# Patient Record
Sex: FEMALE | Race: BLACK OR AFRICAN AMERICAN | NOT HISPANIC OR LATINO | Employment: OTHER | URBAN - METROPOLITAN AREA
[De-identification: names, ages, dates, MRNs, and addresses within clinical notes are randomized per-mention and may not be internally consistent; named-entity substitution may affect disease eponyms.]

---

## 2018-05-15 ENCOUNTER — HOSPITAL ENCOUNTER (INPATIENT)
Facility: HOSPITAL | Age: 78
LOS: 1 days | Discharge: HOME HEALTH | DRG: 638 | End: 2018-05-17
Attending: EMERGENCY MEDICINE | Admitting: INTERNAL MEDICINE
Payer: MEDICARE

## 2018-05-15 DIAGNOSIS — R53.1 WEAKNESS: ICD-10-CM

## 2018-05-15 DIAGNOSIS — K59.00 CONSTIPATION: ICD-10-CM

## 2018-05-15 DIAGNOSIS — Q84.5 ENLARGED AND HYPERTROPHIC NAILS: ICD-10-CM

## 2018-05-15 DIAGNOSIS — I10 HYPERTENSION: ICD-10-CM

## 2018-05-15 DIAGNOSIS — R73.9 HYPERGLYCEMIA: Primary | ICD-10-CM

## 2018-05-15 DIAGNOSIS — E11.01 TYPE 2 DIABETES MELLITUS WITH HYPEROSMOLAR COMA, UNSPECIFIED WHETHER LONG TERM INSULIN USE (HCC): ICD-10-CM

## 2018-05-15 LAB
ACETONE SERPL-MCNC: NEGATIVE MG/DL
ALBUMIN SERPL BCP-MCNC: 3.6 G/DL (ref 3.5–5)
ALP SERPL-CCNC: 103 U/L (ref 46–116)
ALT SERPL W P-5'-P-CCNC: 19 U/L (ref 12–78)
ANION GAP SERPL CALCULATED.3IONS-SCNC: 5 MMOL/L (ref 4–13)
AST SERPL W P-5'-P-CCNC: 13 U/L (ref 5–45)
BACTERIA UR QL AUTO: NORMAL /HPF
BASOPHILS # BLD AUTO: 0.03 THOUSANDS/ΜL (ref 0–0.1)
BASOPHILS NFR BLD AUTO: 1 % (ref 0–1)
BILIRUB SERPL-MCNC: 0.9 MG/DL (ref 0.2–1)
BILIRUB UR QL STRIP: NEGATIVE
BUN SERPL-MCNC: 21 MG/DL (ref 5–25)
CALCIUM SERPL-MCNC: 10.6 MG/DL (ref 8.3–10.1)
CHLORIDE SERPL-SCNC: 96 MMOL/L (ref 100–108)
CLARITY UR: CLEAR
CO2 SERPL-SCNC: 31 MMOL/L (ref 21–32)
COLOR UR: YELLOW
CREAT SERPL-MCNC: 1.15 MG/DL (ref 0.6–1.3)
EOSINOPHIL # BLD AUTO: 0.07 THOUSAND/ΜL (ref 0–0.61)
EOSINOPHIL NFR BLD AUTO: 2 % (ref 0–6)
ERYTHROCYTE [DISTWIDTH] IN BLOOD BY AUTOMATED COUNT: 11.5 % (ref 11.6–15.1)
EST. AVERAGE GLUCOSE BLD GHB EST-MCNC: 240 MG/DL
GFR SERPL CREATININE-BSD FRML MDRD: 46 ML/MIN/1.73SQ M
GLUCOSE SERPL-MCNC: 280 MG/DL (ref 65–140)
GLUCOSE SERPL-MCNC: 283 MG/DL (ref 65–140)
GLUCOSE SERPL-MCNC: 486 MG/DL (ref 65–140)
GLUCOSE SERPL-MCNC: 491 MG/DL (ref 65–140)
GLUCOSE UR STRIP-MCNC: ABNORMAL MG/DL
HBA1C MFR BLD: 10 % (ref 4.2–6.3)
HCT VFR BLD AUTO: 39.3 % (ref 34.8–46.1)
HGB BLD-MCNC: 13.7 G/DL (ref 11.5–15.4)
HGB UR QL STRIP.AUTO: NEGATIVE
KETONES UR STRIP-MCNC: NEGATIVE MG/DL
LEUKOCYTE ESTERASE UR QL STRIP: ABNORMAL
LIPASE SERPL-CCNC: 75 U/L (ref 73–393)
LYMPHOCYTES # BLD AUTO: 2.22 THOUSANDS/ΜL (ref 0.6–4.47)
LYMPHOCYTES NFR BLD AUTO: 46 % (ref 14–44)
MCH RBC QN AUTO: 31.9 PG (ref 26.8–34.3)
MCHC RBC AUTO-ENTMCNC: 34.9 G/DL (ref 31.4–37.4)
MCV RBC AUTO: 92 FL (ref 82–98)
MONOCYTES # BLD AUTO: 0.24 THOUSAND/ΜL (ref 0.17–1.22)
MONOCYTES NFR BLD AUTO: 5 % (ref 4–12)
NEUTROPHILS # BLD AUTO: 2.24 THOUSANDS/ΜL (ref 1.85–7.62)
NEUTS SEG NFR BLD AUTO: 47 % (ref 43–75)
NITRITE UR QL STRIP: NEGATIVE
NON-SQ EPI CELLS URNS QL MICRO: NORMAL /HPF
NRBC BLD AUTO-RTO: 0 /100 WBCS
OTHER STN SPEC: NORMAL
PH UR STRIP.AUTO: 6 [PH] (ref 4.5–8)
PLATELET # BLD AUTO: 177 THOUSANDS/UL (ref 149–390)
PLATELET # BLD AUTO: 181 THOUSANDS/UL (ref 149–390)
PMV BLD AUTO: 10.8 FL (ref 8.9–12.7)
PMV BLD AUTO: 10.9 FL (ref 8.9–12.7)
POTASSIUM SERPL-SCNC: 4.2 MMOL/L (ref 3.5–5.3)
PROT SERPL-MCNC: 8.9 G/DL (ref 6.4–8.2)
PROT UR STRIP-MCNC: ABNORMAL MG/DL
RBC # BLD AUTO: 4.29 MILLION/UL (ref 3.81–5.12)
RBC #/AREA URNS AUTO: NORMAL /HPF
SODIUM SERPL-SCNC: 132 MMOL/L (ref 136–145)
SP GR UR STRIP.AUTO: 1.01 (ref 1–1.03)
TROPONIN I SERPL-MCNC: <0.02 NG/ML
TSH SERPL DL<=0.05 MIU/L-ACNC: 3.8 UIU/ML (ref 0.36–3.74)
UROBILINOGEN UR QL STRIP.AUTO: 0.2 E.U./DL
WBC # BLD AUTO: 4.81 THOUSAND/UL (ref 4.31–10.16)
WBC #/AREA URNS AUTO: NORMAL /HPF

## 2018-05-15 PROCEDURE — 85025 COMPLETE CBC W/AUTO DIFF WBC: CPT | Performed by: EMERGENCY MEDICINE

## 2018-05-15 PROCEDURE — 82009 KETONE BODYS QUAL: CPT | Performed by: EMERGENCY MEDICINE

## 2018-05-15 PROCEDURE — 99285 EMERGENCY DEPT VISIT HI MDM: CPT

## 2018-05-15 PROCEDURE — 84443 ASSAY THYROID STIM HORMONE: CPT | Performed by: PHYSICIAN ASSISTANT

## 2018-05-15 PROCEDURE — 85049 AUTOMATED PLATELET COUNT: CPT | Performed by: PHYSICIAN ASSISTANT

## 2018-05-15 PROCEDURE — 96361 HYDRATE IV INFUSION ADD-ON: CPT

## 2018-05-15 PROCEDURE — 83690 ASSAY OF LIPASE: CPT | Performed by: EMERGENCY MEDICINE

## 2018-05-15 PROCEDURE — 80053 COMPREHEN METABOLIC PANEL: CPT | Performed by: EMERGENCY MEDICINE

## 2018-05-15 PROCEDURE — 82948 REAGENT STRIP/BLOOD GLUCOSE: CPT

## 2018-05-15 PROCEDURE — 84484 ASSAY OF TROPONIN QUANT: CPT | Performed by: EMERGENCY MEDICINE

## 2018-05-15 PROCEDURE — 36415 COLL VENOUS BLD VENIPUNCTURE: CPT | Performed by: EMERGENCY MEDICINE

## 2018-05-15 PROCEDURE — 83036 HEMOGLOBIN GLYCOSYLATED A1C: CPT | Performed by: PHYSICIAN ASSISTANT

## 2018-05-15 PROCEDURE — 96360 HYDRATION IV INFUSION INIT: CPT

## 2018-05-15 PROCEDURE — 81001 URINALYSIS AUTO W/SCOPE: CPT | Performed by: EMERGENCY MEDICINE

## 2018-05-15 PROCEDURE — 99220 PR INITIAL OBSERVATION CARE/DAY 70 MINUTES: CPT | Performed by: INTERNAL MEDICINE

## 2018-05-15 PROCEDURE — 93005 ELECTROCARDIOGRAM TRACING: CPT

## 2018-05-15 RX ORDER — INSULIN GLARGINE 100 [IU]/ML
10 INJECTION, SOLUTION SUBCUTANEOUS
Status: DISCONTINUED | OUTPATIENT
Start: 2018-05-15 | End: 2018-05-16

## 2018-05-15 RX ORDER — SODIUM CHLORIDE 9 MG/ML
75 INJECTION, SOLUTION INTRAVENOUS CONTINUOUS
Status: DISCONTINUED | OUTPATIENT
Start: 2018-05-15 | End: 2018-05-16

## 2018-05-15 RX ORDER — INSULIN GLARGINE 100 [IU]/ML
10 INJECTION, SOLUTION SUBCUTANEOUS ONCE
Status: COMPLETED | OUTPATIENT
Start: 2018-05-15 | End: 2018-05-15

## 2018-05-15 RX ORDER — DOCUSATE SODIUM 100 MG/1
100 CAPSULE, LIQUID FILLED ORAL 2 TIMES DAILY
Status: DISCONTINUED | OUTPATIENT
Start: 2018-05-15 | End: 2018-05-17 | Stop reason: HOSPADM

## 2018-05-15 RX ORDER — ACETAMINOPHEN 325 MG/1
650 TABLET ORAL EVERY 6 HOURS PRN
Status: DISCONTINUED | OUTPATIENT
Start: 2018-05-15 | End: 2018-05-17 | Stop reason: HOSPADM

## 2018-05-15 RX ORDER — CALCIUM CARBONATE 200(500)MG
1000 TABLET,CHEWABLE ORAL DAILY PRN
Status: DISCONTINUED | OUTPATIENT
Start: 2018-05-15 | End: 2018-05-17 | Stop reason: HOSPADM

## 2018-05-15 RX ORDER — HEPARIN SODIUM 5000 [USP'U]/ML
5000 INJECTION, SOLUTION INTRAVENOUS; SUBCUTANEOUS EVERY 8 HOURS SCHEDULED
Status: DISCONTINUED | OUTPATIENT
Start: 2018-05-15 | End: 2018-05-17 | Stop reason: HOSPADM

## 2018-05-15 RX ORDER — LISINOPRIL 5 MG/1
5 TABLET ORAL DAILY
COMMUNITY

## 2018-05-15 RX ADMIN — DOCUSATE SODIUM 100 MG: 100 CAPSULE, LIQUID FILLED ORAL at 17:41

## 2018-05-15 RX ADMIN — INSULIN HUMAN 8 UNITS: 100 INJECTION, SOLUTION PARENTERAL at 11:03

## 2018-05-15 RX ADMIN — INSULIN LISPRO 5 UNITS: 100 INJECTION, SOLUTION INTRAVENOUS; SUBCUTANEOUS at 12:59

## 2018-05-15 RX ADMIN — SODIUM CHLORIDE 75 ML/HR: 0.9 INJECTION, SOLUTION INTRAVENOUS at 12:51

## 2018-05-15 RX ADMIN — HEPARIN SODIUM 5000 UNITS: 5000 INJECTION, SOLUTION INTRAVENOUS; SUBCUTANEOUS at 21:20

## 2018-05-15 RX ADMIN — INSULIN LISPRO 3 UNITS: 100 INJECTION, SOLUTION INTRAVENOUS; SUBCUTANEOUS at 21:21

## 2018-05-15 RX ADMIN — INSULIN HUMAN 15 UNITS: 100 INJECTION, SOLUTION PARENTERAL at 12:20

## 2018-05-15 RX ADMIN — INSULIN GLARGINE 10 UNITS: 100 INJECTION, SOLUTION SUBCUTANEOUS at 13:35

## 2018-05-15 RX ADMIN — SODIUM CHLORIDE 1000 ML: 0.9 INJECTION, SOLUTION INTRAVENOUS at 08:08

## 2018-05-15 RX ADMIN — DOCUSATE SODIUM 100 MG: 100 CAPSULE, LIQUID FILLED ORAL at 13:35

## 2018-05-15 RX ADMIN — HEPARIN SODIUM 5000 UNITS: 5000 INJECTION, SOLUTION INTRAVENOUS; SUBCUTANEOUS at 13:35

## 2018-05-15 RX ADMIN — INSULIN LISPRO 3 UNITS: 100 INJECTION, SOLUTION INTRAVENOUS; SUBCUTANEOUS at 17:00

## 2018-05-15 RX ADMIN — INSULIN GLARGINE 10 UNITS: 100 INJECTION, SOLUTION SUBCUTANEOUS at 21:21

## 2018-05-15 NOTE — ASSESSMENT & PLAN NOTE
Multifactorial, likely largely driven by the hyperglycemia, most likely persistent  · PT/OT consult  · Fall precaution  · Ambulate with rolling walker

## 2018-05-15 NOTE — ED PROVIDER NOTES
History  Chief Complaint   Patient presents with    Weakness - Generalized     Per EMS patient comes to c/o generalized weakness  Patient is non compliant diabetic per EMS, blood sugar 418  Patient states to EMS she is also taking an antibiotic at this time, however is unsure what she is being treated for  Patient presents to the emergency department via ambulance with complaints of weakness and urinary incontinence  Patient is a very poor historian and is unable to provide further details  She denies dysuria fever chills rash  She denies nausea vomiting or abdominal pain  She denies chest pain sob  She is a noncompliant diabetic and her pre-hospital glucose was 418  She denies headache or neurologic complaints other than generalized weakness  It is unclear who called the ambulance as she lives by herself  None       Past Medical History:   Diagnosis Date    Cardiac disease     Diabetes mellitus ([facility] [address] )     Hyperlipidemia     Hypertension        Past Surgical History:   Procedure Laterality Date    ANGIOPLASTY      UTERINE FIBROID SURGERY         History reviewed  No pertinent family history  I have reviewed and agree with the history as documented  Social History   Substance Use Topics    Smoking status: Never Smoker    Smokeless tobacco: Never Used    Alcohol use No        Review of Systems   Constitutional: Positive for fatigue  Negative for activity change, appetite change, chills, diaphoresis and fever  HENT: Negative  Negative for congestion, dental problem, drooling, ear discharge, ear pain, facial swelling, rhinorrhea, sinus pain, sinus pressure, sore throat, trouble swallowing and voice change  Eyes: Negative  Negative for photophobia and visual disturbance  Respiratory: Negative  Negative for cough, chest tightness, shortness of breath and wheezing  Cardiovascular: Negative  Negative for chest pain, palpitations and leg swelling     Gastrointestinal: Negative  Negative for abdominal distention, abdominal pain, anal bleeding, blood in stool, constipation, diarrhea, nausea, rectal pain and vomiting  Endocrine: Negative  Genitourinary: Negative  Negative for difficulty urinating, dysuria, flank pain, frequency, hematuria, urgency, vaginal bleeding, vaginal discharge and vaginal pain  Musculoskeletal: Negative  Negative for arthralgias, back pain, myalgias, neck pain and neck stiffness  Skin: Negative  Negative for rash and wound  Allergic/Immunologic: Negative  Neurological: Positive for weakness  Negative for dizziness, tremors, seizures, syncope, facial asymmetry, speech difficulty, light-headedness, numbness and headaches  Hematological: Negative  Psychiatric/Behavioral: Negative  Physical Exam  ED Triage Vitals [05/15/18 0734]   Temperature Pulse Respirations Blood Pressure SpO2   98 3 °F (36 8 °C) 62 16 159/65 --      Temp Source Heart Rate Source Patient Position - Orthostatic VS BP Location FiO2 (%)   Oral Monitor Lying Right arm --      Pain Score       --           Orthostatic Vital Signs  Vitals:    05/15/18 0734   BP: 159/65   Pulse: 62   Patient Position - Orthostatic VS: Lying       Physical Exam   Constitutional: She is oriented to person, place, and time  She appears well-developed and well-nourished  Comfortable appearance  No respiratory distress  Patient is nontoxic  She Billy answer simple questions appropriately and follow simple commands   HENT:   Head: Normocephalic and atraumatic  Right Ear: External ear normal    Left Ear: External ear normal    Mouth/Throat: Oropharynx is clear and moist    Eyes: Conjunctivae and EOM are normal  Pupils are equal, round, and reactive to light  Neck: Normal range of motion  Neck supple  Cardiovascular: Normal rate, regular rhythm, normal heart sounds and intact distal pulses  Pulmonary/Chest: [place] normal and breath sounds normal  No respiratory distress   She has no wheezes  She has no rales  She exhibits no tenderness  Abdominal: Soft  Bowel sounds are normal  She exhibits no distension and no mass  There is no tenderness  There is no rebound and no guarding  No hernia  No reproducible tenderness or peritoneal signs   Musculoskeletal: Normal range of motion  She exhibits no edema, tenderness or deformity  Neurological: She is alert and oriented to person, place, and time  She has normal reflexes  She displays normal reflexes  No cranial nerve deficit or sensory deficit  She exhibits normal muscle tone  Coordination normal    Skin: Skin is warm and dry  No rash noted  No erythema  No pallor  Psychiatric: She has a normal mood and affect  Her behavior is normal  Judgment and thought content normal    Nursing note and vitals reviewed  ED Medications  Medications   insulin regular (HumuLIN R,NovoLIN R) injection 8 Units (not administered)   sodium chloride 0 9 % bolus 1,000 mL (0 mL Intravenous Stopped 5/15/18 0959)       Diagnostic Studies  Results Reviewed     Procedure Component Value Units Date/Time    Urine Microscopic [[id number]] Collected:  05/15/18 0920    Lab Status:  Final result Specimen:  Urine from Urine, Clean Catch Updated:  05/15/18 0937     RBC, UA None Seen /hpf      WBC, UA None Seen /hpf      Epithelial Cells Occasional /hpf      Bacteria, UA None Seen /hpf      OTHER OBSERVATIONS Yeast Cells Present    UA w Reflex to Microscopic [[id number]]  (Abnormal) Collected:  05/15/18 0920    Lab Status:  Final result Specimen:  Urine from Urine, Clean Catch Updated:  05/15/18 0927     Color, UA Yellow     Clarity, UA Clear     Specific Gravity, UA 1 010     pH, UA 6 0     Leukocytes, UA Elevated glucose may cause decreased leukocyte values   See urine microscopic for [facility] result/ (A)     Nitrite, UA Negative     Protein, UA 30 (1+) (A) mg/dl      Glucose, UA >=1000 (1%) (A) mg/dl      Ketones, UA Negative mg/dl      Urobilinogen, UA 0 2 E U /dl      Bilirubin, UA Negative     Blood, UA Negative    Troponin I [[id number]]  (Normal) Collected:  05/15/18 0806    Lab Status:  Final result Specimen:  Blood from Arm, Right Updated:  05/15/18 0840     Troponin I <0 02 ng/mL     Narrative:         [employer] Chemistry analyzer 99% cutoff is > 0 04 ng/mL in network labs    o cTnI 99% cutoff is useful only when applied to patients in the clinical setting of myocardial ischemia  o cTnI 99% cutoff should be interpreted in the context of clinical history, ECG findings and possibly cardiac imaging to establish correct diagnosis  o cTnI 99% cutoff may be suggestive but clearly not indicative of a coronary event without the clinical setting of myocardial ischemia  Comprehensive metabolic panel [[id number]]  (Abnormal) Collected:  05/15/18 0806    Lab Status:  Final result Specimen:  Blood from Arm, Right Updated:  05/15/18 0835     Sodium 132 (L) mmol/L      Potassium 4 2 mmol/L      Chloride 96 (L) mmol/L      CO2 31 mmol/L      Anion Gap 5 mmol/L      BUN 21 mg/dL      Creatinine 1 15 mg/dL      Glucose 491 (H) mg/dL      Calcium 10 6 (H) mg/dL      AST 13 U/L      ALT 19 U/L      Alkaline Phosphatase 103 U/L      Total Protein 8 9 (H) g/dL      Albumin 3 6 g/dL      Total Bilirubin 0 90 mg/dL      eGFR 46 ml/min/1 73sq m     Narrative:         National Kidney Disease Education Program recommendations are as follows:  GFR calculation is accurate only with a steady state creatinine  Chronic Kidney disease less than 60 ml/min/1 73 sq  meters  Kidney failure less than 15 ml/min/1 73 sq  meters      Lipase [[id number]]  (Normal) Collected:  05/15/18 0806    Lab Status:  Final result Specimen:  Blood from Arm, Right Updated:  05/15/18 0835     Lipase 75 u/L     Acetone [[id number]]  (Normal) Collected:  05/15/18 0806    Lab Status:  Final result Specimen:  Blood from Arm, Right Updated:  05/15/18 0830     Acetone, Bld Negative    CBC and differential [[id number]]  (Abnormal) Collected:  05/15/18 1819 Lab Status:  Final result Specimen:  Blood from Arm, Right Updated:  05/15/18 0813     WBC 4 81 Thousand/uL      RBC 4 29 Million/uL      Hemoglobin 13 7 g/dL      Hematocrit 39 3 %      MCV 92 fL      MCH 31 9 pg      MCHC 34 9 g/dL      RDW 11 5 (L) %      MPV 10 9 fL      Platelets 081 Thousands/uL      nRBC 0 /100 WBCs      Neutrophils Relative 47 %      Lymphocytes Relative 46 (H) %      Monocytes Relative 5 %      Eosinophils Relative 2 %      Basophils Relative 1 %      Neutrophils Absolute 2 24 Thousands/µL      Lymphocytes Absolute 2 22 Thousands/µL      Monocytes Absolute 0 24 Thousand/µL      Eosinophils Absolute 0 07 Thousand/µL      Basophils Absolute 0 03 Thousands/µL                  No orders to display              Procedures  ECG 12 Lead Documentation  Date/Time: 5/15/2018 8:03 AM  Performed by: Doris Farmer  Authorized by: Doris Farmer     ECG reviewed by me, the ED Provider: yes    Patient location:  ED  Previous ECG:     Previous ECG:  Unavailable  Interpretation:     Interpretation: abnormal    Rate:     ECG rate:  64    ECG rate assessment: normal    Rhythm:     Rhythm: paced    Pacing:     Capture:  Complete  Ectopy:     Ectopy: none    QRS:     QRS axis:  Left    QRS intervals: Wide  Conduction:     Conduction: normal    ST segments:     ST segments:  Non-specific  T waves:     T waves: non-specific    Other findings:     Other findings: LVH             Phone Contacts  ED Phone Contact    ED Course  ED Course as of May 15 1038   Tue May 15, 2018   1036   Patient to be admitted to the hospitalist service for further treatment and management of hyperglycemia  Insulin was given                                  MDM  CritCare Time    Disposition  Final diagnoses:   Hyperglycemia   Weakness   Hypertension     Time reflects when diagnosis was documented in both MDM as applicable and the Disposition within this note     Time User Action Codes Description Comment    5/15/2018 10:26 AM Jany Garland Add [R73 9] Hyperglycemia     5/15/2018 10:27 AM Peace Colin Add [R53 1] Weakness     5/15/2018 10:27 AM Peace Colin Add [I10] Hypertension       ED Disposition     ED Disposition Condition Comment    Admit  Case was discussed with PA and the patient's admission status was agreed to be Admission Status: observation status to the service of Dr Cecil Koo    None       Patient's Medications    No medications on file     No discharge procedures on file      ED Provider  Electronically Signed by           Maye Agn MD  05/15/18 4327

## 2018-05-15 NOTE — H&P
H&P- Mona Sanchez 1940, 68 y o  female MRN: [id number]    Unit/Bed#: -01 Encounter: [id number]    Primary Care Provider: No primary care provider on file  Date and time admitted to hospital: 5/15/2018  7:20 AM        * Hyperglycemia   Assessment & Plan    Secondary to uncontrolled diabetes, patient does not check her sugars at home and takes an unknown amount of Humulin  · Bring patient in under observation, non telemetry  · Labs do not show evidence of ketoacidosis, no gap, (-) acetone, urine (-) for ketones  · Humulin R 15 units now, Lantus 10 units now, initiate sliding scale, 10 units Lantus at night  · Q i d  blood glucose checks, consistent carb diet, nutrition consult  · A1c pending  · Hyponatremia, within range for corrected hyperglycemia  · Patient will likely need placement, she is not safe to discharge home to manage her medications        Weakness   Assessment & Plan    Multifactorial, likely largely driven by the hyperglycemia, most likely persistent  · PT/OT consult  · Fall precaution  · Ambulate with rolling walker        Hypertension   Assessment & Plan    Elevated on admission, unsure what she is on other than "lisinopril"          VTE Prophylaxis: Heparin  / sequential compression device   Code Status: LEVEL 1  POLST: POLST form is not discussed and not completed at this time  Discussion with family: DIL at bedside, also discussed with son over the phone     Anticipated Length of Stay:  Patient will be admitted on an Observation basis with an anticipated length of stay of  < 2 midnights  Justification for Hospital Stay: Hyperglycemia    Total Time for Visit, including Counseling / Coordination of Care: 45 minutes  Greater than 50% of this total time spent on direct patient counseling and coordination of care  Chief Complaint:   Hyperglycemia    History of Present Illness:    Mona Sanchez is a 68 y o  female who presents with hyperglycemia    Patient is a poor historian, she is not clear what her medical conditions are or what medication she takes/doses that she is on  Daughter-in-law at the bedside, is on clear of her medical history as well  Just with the son, also unclear  Daughter, Charlene Nash (?)  Is POA, we have not been able to contact her yet  Patient presents for hyperglycemia, generalized weakness which is chronic  Review of Systems:    Review of Systems   Unable to perform ROS: Dementia       Past Medical and Surgical History:     Past Medical History:   Diagnosis Date    Cardiac disease     [facility] mellitus ([facility] [address] [facility]     Hyperlipidemia     Hypertension        Past Surgical History:   Procedure Laterality Date    ANGIOPLASTY      UTERINE FIBROID SURGERY         Meds/Allergies:    Prior to Admission medications    Medication Sig Start Date End Date Taking? Authorizing Provider   insulin regular (HumuLIN R,NovoLIN R) 100 units/mL injection Inject 10 Units under the skin once   Yes Historical Provider, MD   lisinopril (ZESTRIL) 5 mg tablet Take 5 mg by mouth daily   Yes Historical Provider, MD     I have been unable to obtain / verify an up to date medication list despite all reasonable attempts  Allergies: No Known Allergies    Social History:     Marital Status:    Occupation:   Patient Pre-hospital Living Situation:   Lives home alone  Patient Pre-hospital Level of Mobility:  Rolling walker  Patient Pre-hospital Diet Restrictions:  Patient does not restrict her diet  Substance Use History:   History   Alcohol Use No     History   Smoking Status    Never Smoker   Smokeless Tobacco    Never Used     History   Drug Use No       Family History:    History reviewed  No pertinent family history      Physical Exam:     Vitals:   Blood Pressure: 150/79 (05/15/18 1136)  Pulse: 69 (05/15/18 1136)  Temperature: 98 2 °F (36 8 °C) (05/15/18 1136)  Temp Source: Oral (05/15/18 1136)  Respirations: 18 (05/15/18 1136)  Height: 5' 8" (172 7 cm) (05/15/18 3934)  Weight - Scale: 89 9 kg (198 lb 1 6 oz) (05/15/18 0734)  SpO2: 100 % (05/15/18 1136)    Physical Exam   Constitutional: She appears well-developed  AAF, NAD   HENT:   Head: Normocephalic and atraumatic  Mouth/Throat: Oropharynx is clear and moist    Eyes: Conjunctivae and EOM are normal  Pupils are equal, round, and reactive to light  Arcus senilus bilateral   Neck: Neck supple  No JVD present  Carotid bruit is not present  No thyromegaly present  Cardiovascular: Normal rate, regular rhythm, S1 normal, S2 normal, normal heart sounds and intact distal pulses  No murmur heard  Pulmonary/Chest: [place] normal and breath sounds normal  No respiratory distress  She has no wheezes  She has no rhonchi  She has no rales  Abdominal: Soft  Normal appearance and bowel sounds are normal  She exhibits no distension  There is no tenderness  Musculoskeletal: She exhibits no edema  Lymphadenopathy:     She has no cervical adenopathy  Neurological: She is alert  No cranial nerve deficit  Skin: Skin is warm, dry and intact  Nursing note and vitals reviewed  Additional Data:     Lab Results: I have personally reviewed pertinent reports  Results from last 7 days  Lab Units 05/15/18  0806   WBC Thousand/uL 4 81   HEMOGLOBIN g/dL 13 7   HEMATOCRIT % 39 3   PLATELETS Thousands/uL 181   NEUTROS PCT % 47   LYMPHS PCT % 46*   MONOS PCT % 5   EOS PCT % 2       Results from last 7 days  Lab Units 05/15/18  0806   SODIUM mmol/L 132*   POTASSIUM mmol/L 4 2   CHLORIDE mmol/L 96*   CO2 mmol/L 31   BUN mg/dL 21   CREATININE mg/dL 1 15   CALCIUM mg/dL 10 6*   TOTAL PROTEIN g/dL 8 9*   BILIRUBIN TOTAL mg/dL 0 90   ALK PHOS U/L 103   ALT U/L 19   AST U/L 13   GLUCOSE RANDOM mg/dL 491*           Results from last 7 days  Lab Units 05/15/18  1138   POC GLUCOSE mg/dl 486*           Imaging: I have personally reviewed pertinent reports        No orders to display       EKG, Pathology, and Other Studies Reviewed on Admission: · EKG: None    Allscripts / Epic Records Reviewed: Yes none available     ** Please Note: This note has been constructed using a voice recognition system   **

## 2018-05-15 NOTE — PLAN OF CARE
Problem: Potential for Falls  Goal: Patient will remain free of falls  INTERVENTIONS:  - Assess patient frequently for physical needs  -  Identify cognitive and physical deficits and behaviors that affect risk of falls    -  [place] fall precautions as indicated by assessment   - Educate patient/family on patient safety including physical limitations  - Instruct patient to call for assistance with activity based on assessment  - Modify environment to reduce risk of injury  - Consider OT/PT consult to assist with strengthening/mobility   Outcome: Progressing      Problem: GENITOURINARY - ADULT  Goal: Maintains or returns to baseline urinary function  INTERVENTIONS:  - Assess urinary function  - Encourage oral fluids to ensure adequate hydration  - Administer IV fluids as ordered to ensure adequate hydration  - Administer ordered medications as needed  - Offer frequent toileting  - Follow urinary retention protocol if ordered  Outcome: Progressing      Problem: METABOLIC, FLUID AND ELECTROLYTES - ADULT  Goal: Electrolytes maintained within normal limits  INTERVENTIONS:  - Monitor labs and assess patient for signs and symptoms of electrolyte imbalances  - Administer electrolyte replacement as ordered  - Monitor response to electrolyte replacements, including repeat lab results as appropriate  - Instruct patient on fluid and nutrition as appropriate  Outcome: Progressing    Goal: Fluid balance maintained  INTERVENTIONS:  - Monitor labs and assess for signs and symptoms of volume excess or deficit  - Monitor I/O and WT  - Instruct patient on fluid and nutrition as appropriate  Outcome: Progressing    Goal: Glucose maintained within target range  INTERVENTIONS:  - Monitor Blood Glucose as ordered  - Assess for signs and symptoms of hyperglycemia and hypoglycemia  - Administer ordered medications to maintain glucose within target range  - Assess nutritional intake and initiate nutrition service referral as needed  Outcome: Progressing

## 2018-05-15 NOTE — ASSESSMENT & PLAN NOTE
Secondary to uncontrolled diabetes, patient does not check her sugars at home and takes an unknown amount of Humulin  · Bring patient in under observation, non telemetry  · Labs do not show evidence of ketoacidosis, no gap, (-) acetone, urine (-) for ketones  · Humulin R 15 units now, Lantus 10 units now, initiate sliding scale, 10 units Lantus at night  · Q i d  blood glucose checks, consistent carb diet, nutrition consult  · A1c pending  · Hyponatremia, within range for corrected hyperglycemia  · Patient will likely need placement, she is not safe to discharge home to manage her medications

## 2018-05-16 PROBLEM — E11.9 TYPE 2 DIABETES MELLITUS (HCC): Status: ACTIVE | Noted: 2018-05-15

## 2018-05-16 LAB
ANION GAP SERPL CALCULATED.3IONS-SCNC: 8 MMOL/L (ref 4–13)
ATRIAL RATE: 64 BPM
BUN SERPL-MCNC: 15 MG/DL (ref 5–25)
CALCIUM SERPL-MCNC: 9.6 MG/DL (ref 8.3–10.1)
CHLORIDE SERPL-SCNC: 102 MMOL/L (ref 100–108)
CO2 SERPL-SCNC: 28 MMOL/L (ref 21–32)
CREAT SERPL-MCNC: 0.9 MG/DL (ref 0.6–1.3)
GFR SERPL CREATININE-BSD FRML MDRD: 71 ML/MIN/1.73SQ M
GLUCOSE P FAST SERPL-MCNC: 241 MG/DL (ref 65–99)
GLUCOSE SERPL-MCNC: 177 MG/DL (ref 65–140)
GLUCOSE SERPL-MCNC: 241 MG/DL (ref 65–140)
GLUCOSE SERPL-MCNC: 259 MG/DL (ref 65–140)
GLUCOSE SERPL-MCNC: 404 MG/DL (ref 65–140)
GLUCOSE SERPL-MCNC: 404 MG/DL (ref 65–140)
P AXIS: 73 DEGREES
POTASSIUM SERPL-SCNC: 3.9 MMOL/L (ref 3.5–5.3)
PR INTERVAL: 134 MS
QRS AXIS: -76 DEGREES
QRSD INTERVAL: 168 MS
QT INTERVAL: 472 MS
QTC INTERVAL: 486 MS
SODIUM SERPL-SCNC: 138 MMOL/L (ref 136–145)
T WAVE AXIS: 96 DEGREES
VENTRICULAR RATE: 64 BPM

## 2018-05-16 PROCEDURE — G8979 MOBILITY GOAL STATUS: HCPCS

## 2018-05-16 PROCEDURE — 80048 BASIC METABOLIC PNL TOTAL CA: CPT | Performed by: PHYSICIAN ASSISTANT

## 2018-05-16 PROCEDURE — 93010 ELECTROCARDIOGRAM REPORT: CPT | Performed by: INTERNAL MEDICINE

## 2018-05-16 PROCEDURE — 99232 SBSQ HOSP IP/OBS MODERATE 35: CPT | Performed by: INTERNAL MEDICINE

## 2018-05-16 PROCEDURE — 82948 REAGENT STRIP/BLOOD GLUCOSE: CPT

## 2018-05-16 PROCEDURE — G8978 MOBILITY CURRENT STATUS: HCPCS

## 2018-05-16 PROCEDURE — 97163 PT EVAL HIGH COMPLEX 45 MIN: CPT

## 2018-05-16 RX ORDER — POLYETHYLENE GLYCOL 3350 17 G/17G
17 POWDER, FOR SOLUTION ORAL DAILY
Status: DISCONTINUED | OUTPATIENT
Start: 2018-05-16 | End: 2018-05-17 | Stop reason: HOSPADM

## 2018-05-16 RX ORDER — SENNOSIDES 8.6 MG
1 TABLET ORAL
Status: DISCONTINUED | OUTPATIENT
Start: 2018-05-16 | End: 2018-05-17 | Stop reason: HOSPADM

## 2018-05-16 RX ORDER — INSULIN GLARGINE 100 [IU]/ML
20 INJECTION, SOLUTION SUBCUTANEOUS
Status: DISCONTINUED | OUTPATIENT
Start: 2018-05-16 | End: 2018-05-17 | Stop reason: HOSPADM

## 2018-05-16 RX ADMIN — HEPARIN SODIUM 5000 UNITS: 5000 INJECTION, SOLUTION INTRAVENOUS; SUBCUTANEOUS at 05:23

## 2018-05-16 RX ADMIN — INSULIN GLARGINE 20 UNITS: 100 INJECTION, SOLUTION SUBCUTANEOUS at 22:03

## 2018-05-16 RX ADMIN — INSULIN LISPRO 1 UNITS: 100 INJECTION, SOLUTION INTRAVENOUS; SUBCUTANEOUS at 22:05

## 2018-05-16 RX ADMIN — POLYETHYLENE GLYCOL 3350 17 G: 17 POWDER, FOR SOLUTION ORAL at 10:09

## 2018-05-16 RX ADMIN — SODIUM CHLORIDE 75 ML/HR: 0.9 INJECTION, SOLUTION INTRAVENOUS at 02:43

## 2018-05-16 RX ADMIN — HEPARIN SODIUM 5000 UNITS: 5000 INJECTION, SOLUTION INTRAVENOUS; SUBCUTANEOUS at 13:40

## 2018-05-16 RX ADMIN — INSULIN LISPRO 10 UNITS: 100 INJECTION, SOLUTION INTRAVENOUS; SUBCUTANEOUS at 15:51

## 2018-05-16 RX ADMIN — SENNOSIDES 8.6 MG: 8.6 TABLET, FILM COATED ORAL at 22:03

## 2018-05-16 RX ADMIN — DOCUSATE SODIUM 100 MG: 100 CAPSULE, LIQUID FILLED ORAL at 08:10

## 2018-05-16 RX ADMIN — INSULIN LISPRO 2 UNITS: 100 INJECTION, SOLUTION INTRAVENOUS; SUBCUTANEOUS at 08:10

## 2018-05-16 RX ADMIN — INSULIN LISPRO 10 UNITS: 100 INJECTION, SOLUTION INTRAVENOUS; SUBCUTANEOUS at 13:40

## 2018-05-16 RX ADMIN — INSULIN LISPRO 5 UNITS: 100 INJECTION, SOLUTION INTRAVENOUS; SUBCUTANEOUS at 11:36

## 2018-05-16 RX ADMIN — DOCUSATE SODIUM 100 MG: 100 CAPSULE, LIQUID FILLED ORAL at 19:50

## 2018-05-16 RX ADMIN — INSULIN LISPRO 5 UNITS: 100 INJECTION, SOLUTION INTRAVENOUS; SUBCUTANEOUS at 15:51

## 2018-05-16 RX ADMIN — HEPARIN SODIUM 5000 UNITS: 5000 INJECTION, SOLUTION INTRAVENOUS; SUBCUTANEOUS at 22:03

## 2018-05-16 NOTE — SOCIAL WORK
Cm met w pt who resides by herself in an apartment with an elevator  Uses [facility], but reports some difficulty  Was recently visiting her son in [place] when she became sick  However, she does plan to return home  She does not drive  Daughter assists with transports  No POA or advanced directive  Did not desire resources  Uses [employer] [facility] for [facility]  [employer]  Hx of VNA, unsure of the name of agency  Has ride home at d c  No other reported needs at this time  However, will await any PT eval recommendations  Observation notice given    CM reviewed discharge planning process including the following: identifying help at home, patient preference for discharge planning needs, pharmacy preference, and availability of treatment team to discuss questions or concerns patient and/or family may have regarding understanding medications and recognizing signs and symptoms once discharged  CM also encouraged patient to follow up with all recommended appointments after discharge  Patient advised of importance for patient and family to participate in managing patients medical well being  CM name and role reviewed and Discharge Checklist provided  Encouraged patient and caregiver to review prior to discharge

## 2018-05-16 NOTE — CASE MANAGEMENT
Initial Clinical Review    Admission: Date/Time/Statement: OBS 5/15   1038 converted to IP on 5/16  1339      Orders Placed This Encounter   Procedures    Place in Observation (expected length of stay for this patient is less than two midnights)     Standing Status:   Standing     Number of Occurrences:   1     Order Specific Question:   Admitting Physician     Answer:   Queenie Lipoma [[id number]]     Order Specific Question:   Level of Care     Answer:   Med Surg [16]         ED: Date/Time/Mode of Arrival:   ED Arrival Information     Expected Arrival Acuity Means of Arrival Escorted By Service Admission Type    - 5/15/2018 07:20 Urgent Ambulance [address]  [facility] General Medicine -    Arrival Complaint    Weakness          Chief Complaint:   Chief Complaint   Patient presents with    Weakness - Generalized     Per EMS patient comes to c/o generalized weakness  Patient is non compliant diabetic per EMS, blood sugar 418  Patient states to EMS she is also taking an antibiotic at this time, however is unsure what she is being treated for  History of Illness: Ania Stack is a 68 y o  female who presents with hyperglycemia  Patient is a poor historian, she is not clear what her medical conditions are or what medication she takes/doses that she is on    Daughter-in-law at the bedside, is on clear of her medical history as well  Just with the son, also unclear    Daughter, Anatoliy Gupta (?)  Is POA, we have not been able to contact her yet    Patient presents for hyperglycemia, generalized weakness which is chronic    ED Vital Signs:   ED Triage Vitals   Temperature Pulse Respirations Blood Pressure SpO2   05/15/18 0734 05/15/18 0734 05/15/18 0734 05/15/18 0734 05/15/18 1045   98 3 °F (36 8 °C) 62 16 159/65 97 %      Temp Source Heart Rate Source Patient Position - Orthostatic VS BP Location FiO2 (%)   05/15/18 0734 05/15/18 0734 05/15/18 0734 05/15/18 0734 --   Oral Monitor Lying Right arm       Pain Score 05/15/18 1126       No Pain        Wt Readings from Last 1 Encounters:   05/15/18 89 9 kg (198 lb 3 1 oz)       Vital Signs (abnormal): wnl     Abnormal Labs/Diagnostic Test Results: na   132, cl  96, gluc   491, marco  10 6, total prot   8 9  EKG- NSR     ED Treatment:   Medication Administration from 05/15/2018 0720 to 05/15/2018 1119       Date/Time Order Dose Route Action Action by Comments     05/15/2018 0959 sodium chloride 0 9 % bolus 1,000 mL 0 mL Intravenous Stopped Daphne Miller RN      05/15/2018 0808 sodium chloride 0 9 % bolus 1,000 mL 1,000 mL Intravenous [address] Daphne Miller RN      05/15/2018 1103 insulin regular (HumuLIN R,NovoLIN R) injection 8 Units 8 Units Subcutaneous Given Daphne Miller RN           Past Medical/Surgical History:    Active Ambulatory Problems     Diagnosis Date Noted    No Active Ambulatory Problems     Resolved Ambulatory Problems     Diagnosis Date Noted    No Resolved Ambulatory Problems     Past Medical History:   Diagnosis Date    Card[facility] disease     [facility]     Hyperlipidemia     Hypertension        Admitting Diagnosis: Weakness [R53 1]  Hypertension [I10]  Hyperglycemia [R73 9]    Age/Sex: 68 y o  female    Assessment/Plan:        * Hyperglycemia   Assessment & Plan     Secondary to uncontrolled diabetes, patient does not check her sugars at home and takes an unknown amount of Humulin  · Bring patient in under observation, non telemetry  · Labs do not show evidence of ketoacidosis, no gap, (-) acetone, urine (-) for ketones  · Humulin R 15 units now, Lantus 10 units now, initiate sliding scale, 10 units Lantus at night  · Q i d  blood glucose checks, consistent carb diet, nutrition consult  · A1c pending  · Hyponatremia, within range for corrected hyperglycemia  · Patient will likely need placement, she is not safe to discharge home to manage her medications          Weakness   Assessment & Plan     Multifactorial, likely largely driven by the hyperglycemia, most likely persistent  · PT/OT consult  · Fall precaution  · Ambulate with rolling walker          Hypertension   Assessment & Plan     Elevated on admission, unsure what she is on other than "lisinopril"             VTE Prophylaxis: Heparin  / sequential compression device   Code Status: LEVEL 1  POLST: POLST form is not discussed and not completed at this time  Discussion with family: DIL at bedside, also discussed with son over the phone      Anticipated Length of Stay:  Patient will be admitted on an Observation basis with an anticipated length of stay of  < 2 midnights     Justification for Hospital Stay: Hyperglycemia       Admission Orders:  Scheduled Meds:   Current Facility-Administered Medications:  acetaminophen 650 mg Oral Q6H PRN Anastasialisa Bruce PA-C    calcium carbonate 1,000 mg Oral Daily PRN RADHA Henry-C    docusate sodium 100 mg Oral BID RADHA Henry-C    heparin (porcine) 5,000 Units Subcutaneous Q8H [address] Brendayahir Daniel PA-C    influenza vaccine 0 5 mL Intramuscular Prior to discharge Anastasialisa Bruce PA-C    insulin glargine 10 Units Subcutaneous HS Brenda Daniel PA-C    insulin lispro 1-5 Units Subcutaneous 4x Daily (AC & HS) Brenda Daniel, PA-C    pneumococcal 13-valent conjugate vaccine 0 5 mL Intramuscular Prior to discharge Anastasia Janis PA-C    sodium chloride 75 mL/hr Intravenous Continuous Anastasia Janis PA-C Last Rate: 75 mL/hr (05/16/18 0243)     Continuous Infusions:   sodium chloride 75 mL/hr Last Rate: 75 mL/hr (05/16/18 0243)     PRN Meds:   acetaminophen    calcium carbonate    influenza vaccine    pneumococcal 13-valent conjugate vaccine    fingerstick ac and hs  Cons carb diet   Up w/ assist   I&O   Fall precautions  Inc spirom   5/16  Bmp   Gluc   241  podiatry consult

## 2018-05-16 NOTE — PLAN OF CARE
DISCHARGE PLANNING - CARE MANAGEMENT     Discharge to post-acute care or home with appropriate resources Progressing        GENITOURINARY - ADULT     Maintains or returns to baseline urinary function Progressing        METABOLIC, FLUID AND ELECTROLYTES - ADULT     Electrolytes maintained within normal limits Progressing     Fluid balance maintained Progressing     Glucose maintained within target range Progressing        Nutrition/Hydration-ADULT     Nutrient/Hydration intake appropriate for improving, restoring or maintaining nutritional needs Progressing        Potential for Falls     Patient will remain free of falls Progressing        Prexisting or High Potential for Compromised Skin Integrity     Skin integrity is maintained or improved Progressing

## 2018-05-16 NOTE — ASSESSMENT & PLAN NOTE
Secondary to uncontrolled diabetes, patient does not check her sugars at home and takes unknown amount of humulin  Continue Lantus, mealtime insulin with sliding scale    Blood sugars getting better  · Q i d  blood glucose checks, consistent carb diet, nutrition consult  · A1c 10%  · Close outpatient follow-up with PCP after discharge  · Counseled patient on importance of blood sugar control

## 2018-05-16 NOTE — PHYSICAL THERAPY NOTE
PT Evaluation (15min)  (13:50-14:05)    Past Medical History:   Diagnosis Date    Cardiac disease     [facility]     Hyperlipidemia     Hypertension         05/16/18 1405   Note Type   Note type Eval only   Pain Assessment   Pain Assessment No/denies pain   Home Living   Type of [address] One level;Elevator   Bathroom Equipment Shower chair;Grab bars around toilet   [address] [facility]; Other (Comment)  (rollator)   Prior Function   Level of [place] Independent with ADLs and functional mobility  (ambulates c rollator)   Lives With Alone   Receives Help From Family  (dtr + son live nearby)   ADL Assistance Independent   IADLs Needs assistance   Falls in the last 6 months 0   Restrictions/Precautions   Other Precautions Chair Alarm; Bed Alarm; Fall Risk   General   Additional Pertinent History pt presents to [facility] c hyperglycemia  pt c h/o non-compliance c insulin  PT consulted for mobility + d/c planning  up c (A)  Family/Caregiver Present No   Cognition   Orientation Level Oriented X4   RUE Assessment   RUE Assessment WFL  (4/5)   LUE Assessment   LUE Assessment WFL  (4/5)   RLE Assessment   RLE Assessment WFL  (4-/5)   LLE Assessment   LLE Assessment WFL  (4-/5)   Coordination   Sensation X   Light Touch   RLE Light Touch Impaired   RLE Light Touch Comments plantar surface of foot   LLE Light Touch Impaired   LLE Light Touch Comments plantar surface of foot   Transfers   Sit to Stand 5  Supervision   Additional items Armrests; Verbal cues   Stand to Sit 5  Supervision   Additional items Armrests; Verbal cues   Ambulation/Elevation   Gait pattern Narrow GIFTY; Decreased foot clearance; Excessively slow   Gait Assistance 5  Supervision   Additional items Verbal cues   Assistive Device Rolling walker   Distance 120'   Balance   Static Sitting Good   Dynamic Sitting Good   Static Standing Fair -   Dynamic Standing Fair -   Ambulatory Fair -   Higher level balance (TUG score: 38sec (high fall risk))   Activity Tolerance   Activity Tolerance Patient limited by fatigue   Nurse Made Aware Lynnette   Assessment   Prognosis Good   Problem List Decreased strength; Impaired balance;Decreased endurance;Decreased mobility; Impaired sensation   Assessment pt is a 78y/o f who presents to [facility] c hyperglycemia  pt c suspected non-compliance c insulin  PMH significant for HTN, DM, hyperlipidemia, + CAD  at baseline, pt mod (I) c functional mobility c rollator  resides alone in apt c elevator access  currently presents c deficits in strength, balance, gait quality, sensation, + activity tolerance noted in PT exam above  Barthel Index 65/100  min verbal cues required for safety c transitions especially c descent to remain c in GIFTY of RW  ambulated 120' c RW demonstrating gait deviations above  at high risk for falls evidenced by TUG score of 38sec  pt would benefit from skilled PT to maximize functional mobility + return home safely  upon d/c, recommend hhpt  PT eval of high complexity 2* unstable med status c pt requiring ongoing medical management 2* hyperglycemia  pt c multiple co-morbidities including DM, weakness, HTN, CAD, + hyperlipidemia  pt lives alone in apt c h/o medical non-compliance c insulin  presents c mobility deficits above requiring (S) for mobility  also at increased fall risk c TUG score of 38sec  Barriers to Discharge Decreased caregiver support   Goals   Patient Goals "to go home"  STG Expiration Date 05/23/18   Short Term Goal #1 1  increase strength 1/2 grade to improve overall functional mobility, 2  perform transfers mod (I) to safely perform ADLs, 3  ambulate 300' mod (I) c RW to safely navigate in community, 4  improve TUG score to <20sec to decrease fall risk   Plan   Treatment/Interventions Functional transfer training;LE strengthening/ROM; Therapeutic exercise; Endurance training;Patient/family training;Bed mobility;Gait training;Spoke to nursing;Spoke to MD   PT Frequency 5x/wk Recommendation   Recommendation Home PT   PT - OK to Discharge Yes   Barthel Index   Feeding 10   Bathing 0   Grooming Score 5   Dressing Score 5   Bladder Score 10   Bowels Score 10   Toilet Use Score 5   Transfers (Bed/Chair) Score 10   Mobility (Level Surface) Score 10   Stairs Score 0   Barthel Index Score 65     Wilmar Corona, PT

## 2018-05-16 NOTE — ASSESSMENT & PLAN NOTE
Multifactorial, likely largely driven by the hyperglycemia, deconditioning    · Fall precaution  · Ambulate with rolling walker  · PT/OT recommended home therapy

## 2018-05-16 NOTE — PLAN OF CARE
GENITOURINARY - ADULT     Maintains or returns to baseline urinary function Progressing        METABOLIC, FLUID AND ELECTROLYTES - ADULT     Electrolytes maintained within normal limits Progressing     Fluid balance maintained Progressing     Glucose maintained within target range Progressing        Potential for Falls     Patient will remain free of falls Progressing        Prexisting or High Potential for Compromised Skin Integrity     Skin integrity is maintained or improved Progressing

## 2018-05-16 NOTE — PLAN OF CARE
Problem: PHYSICAL THERAPY ADULT  Goal: Performs mobility at highest level of function for planned discharge setting  See evaluation for individualized goals  Treatment/Interventions: Functional transfer training, LE strengthening/ROM, Therapeutic exercise, Endurance training, Patient/family training, Bed mobility, Gait training, Spoke to nursing, Spoke to MD          See flowsheet documentation for full assessment, interventions and recommendations  Prognosis: Good  Problem List: Decreased strength, Impaired balance, Decreased endurance, Decreased mobility, Impaired sensation  Assessment: pt is a 78y/o f who presents to [facility] c hyperglycemia  pt c suspected non-compliance c insulin  PMH significant for HTN, DM, hyperlipidemia, + CAD  at baseline, pt mod (I) c functional mobility c rollator  resides alone in apt c elevator access  currently presents c deficits in strength, balance, gait quality, sensation, + activity tolerance noted in PT exam above  Barthel Index 65/100  min verbal cues required for safety c transitions especially c descent to remain c in GIFTY of RW  ambulated 120' c RW demonstrating gait deviations above  at high risk for falls evidenced by TUG score of 38sec  pt would benefit from skilled PT to maximize functional mobility + return home safely  upon d/c, recommend hhpt  PT eval of high complexity 2* unstable med status c pt requiring ongoing medical management 2* hyperglycemia  pt c multiple co-morbidities including DM, weakness, HTN, CAD, + hyperlipidemia  pt lives alone in apt c h/o medical non-compliance c insulin  presents c mobility deficits above requiring (S) for mobility  also at increased fall risk c TUG score of 38sec  Barriers to Discharge: Decreased caregiver support     Recommendation: Home PT     PT - OK to Discharge: Yes    See flowsheet documentation for full assessment

## 2018-05-16 NOTE — PLAN OF CARE
Problem: DISCHARGE PLANNING - CARE MANAGEMENT  Goal: Discharge to post-acute care or home with appropriate resources  INTERVENTIONS:  - Conduct assessment to determine patient/family and health care team treatment goals, and need for post-acute services based on payer coverage, community resources, and patient preferences, and barriers to discharge  - Address psychosocial, clinical, and financial barriers to discharge as identified in assessment in conjunction with the patient/family and health care team  - Arrange appropriate level of post-acute services according to patients   needs and preference and payer coverage in collaboration with the physician and health care team  - Communicate with and update the patient/family, physician, and health care team regarding progress on the discharge plan  - Arrange appropriate transportation to post-acute venues  Outcome: Progressing  Cm met w pt who resides by herself in an apartment with an elevator  Uses rw  ind w adls, but reports some difficulty  Was recently visiting her son in [place] when she became sick  However, she does plan to return home  She does not drive  Daughter assists with transports  No POA or advanced directive  Did not desire resources  Uses [facility]  [employer]  Hx of VNA, unsure of the name of agency  Has ride home at d c  No other reported needs at this time  However, will await any PT eval recommendations  Observation notice given    CM reviewed discharge planning process including the following: identifying help at home, patient preference for discharge planning needs, pharmacy preference, and availability of treatment team to discuss questions or concerns patient and/or family may have regarding understanding medications and recognizing signs and symptoms once discharged  CM also encouraged patient to follow up with all recommended appointments after discharge   Patient advised of importance for patient and family to participate in managing patients medical well being  CM name and role reviewed and Discharge Checklist provided  Encouraged patient and caregiver to review prior to discharge

## 2018-05-17 VITALS
BODY MASS INDEX: 30.04 KG/M2 | RESPIRATION RATE: 18 BRPM | DIASTOLIC BLOOD PRESSURE: 72 MMHG | SYSTOLIC BLOOD PRESSURE: 164 MMHG | WEIGHT: 198.19 LBS | TEMPERATURE: 97.9 F | HEIGHT: 68 IN | HEART RATE: 64 BPM | OXYGEN SATURATION: 100 %

## 2018-05-17 LAB
GLUCOSE SERPL-MCNC: 187 MG/DL (ref 65–140)
GLUCOSE SERPL-MCNC: 297 MG/DL (ref 65–140)
GLUCOSE SERPL-MCNC: 309 MG/DL (ref 65–140)

## 2018-05-17 PROCEDURE — G0009 ADMIN PNEUMOCOCCAL VACCINE: HCPCS | Performed by: PHYSICIAN ASSISTANT

## 2018-05-17 PROCEDURE — 99239 HOSP IP/OBS DSCHRG MGMT >30: CPT | Performed by: INTERNAL MEDICINE

## 2018-05-17 PROCEDURE — 90686 IIV4 VACC NO PRSV 0.5 ML IM: CPT | Performed by: PHYSICIAN ASSISTANT

## 2018-05-17 PROCEDURE — 97167 OT EVAL HIGH COMPLEX 60 MIN: CPT

## 2018-05-17 PROCEDURE — G8987 SELF CARE CURRENT STATUS: HCPCS

## 2018-05-17 PROCEDURE — 82948 REAGENT STRIP/BLOOD GLUCOSE: CPT

## 2018-05-17 PROCEDURE — 90670 PCV13 VACCINE IM: CPT | Performed by: PHYSICIAN ASSISTANT

## 2018-05-17 PROCEDURE — G8988 SELF CARE GOAL STATUS: HCPCS

## 2018-05-17 PROCEDURE — G0008 ADMIN INFLUENZA VIRUS VAC: HCPCS | Performed by: PHYSICIAN ASSISTANT

## 2018-05-17 RX ORDER — DOCUSATE SODIUM 100 MG/1
100 CAPSULE, LIQUID FILLED ORAL 2 TIMES DAILY
Qty: 10 CAPSULE | Refills: 0 | Status: SHIPPED | OUTPATIENT
Start: 2018-05-17

## 2018-05-17 RX ORDER — POLYETHYLENE GLYCOL 3350 17 G/17G
17 POWDER, FOR SOLUTION ORAL DAILY
Qty: 14 EACH | Refills: 0 | Status: SHIPPED | OUTPATIENT
Start: 2018-05-18

## 2018-05-17 RX ADMIN — POLYETHYLENE GLYCOL 3350 17 G: 17 POWDER, FOR SOLUTION ORAL at 08:33

## 2018-05-17 RX ADMIN — DOCUSATE SODIUM 100 MG: 100 CAPSULE, LIQUID FILLED ORAL at 18:49

## 2018-05-17 RX ADMIN — HEPARIN SODIUM 5000 UNITS: 5000 INJECTION, SOLUTION INTRAVENOUS; SUBCUTANEOUS at 06:27

## 2018-05-17 RX ADMIN — DOCUSATE SODIUM 100 MG: 100 CAPSULE, LIQUID FILLED ORAL at 08:33

## 2018-05-17 RX ADMIN — INSULIN LISPRO 10 UNITS: 100 INJECTION, SOLUTION INTRAVENOUS; SUBCUTANEOUS at 12:38

## 2018-05-17 RX ADMIN — INSULIN LISPRO 10 UNITS: 100 INJECTION, SOLUTION INTRAVENOUS; SUBCUTANEOUS at 17:49

## 2018-05-17 RX ADMIN — INSULIN LISPRO 3 UNITS: 100 INJECTION, SOLUTION INTRAVENOUS; SUBCUTANEOUS at 17:11

## 2018-05-17 RX ADMIN — INSULIN LISPRO 1 UNITS: 100 INJECTION, SOLUTION INTRAVENOUS; SUBCUTANEOUS at 07:46

## 2018-05-17 RX ADMIN — INFLUENZA VIRUS VACCINE 0.5 ML: 15; 15; 15; 15 SUSPENSION INTRAMUSCULAR at 19:07

## 2018-05-17 RX ADMIN — HEPARIN SODIUM 5000 UNITS: 5000 INJECTION, SOLUTION INTRAVENOUS; SUBCUTANEOUS at 15:19

## 2018-05-17 RX ADMIN — INSULIN LISPRO 3 UNITS: 100 INJECTION, SOLUTION INTRAVENOUS; SUBCUTANEOUS at 12:37

## 2018-05-17 RX ADMIN — PNEUMOCOCCAL 13-VALENT CONJUGATE VACCINE 0.5 ML: 2.2; 2.2; 2.2; 2.2; 2.2; 4.4; 2.2; 2.2; 2.2; 2.2; 2.2; 2.2; 2.2 INJECTION, SUSPENSION INTRAMUSCULAR at 19:03

## 2018-05-17 RX ADMIN — INSULIN LISPRO 10 UNITS: 100 INJECTION, SOLUTION INTRAVENOUS; SUBCUTANEOUS at 08:32

## 2018-05-17 NOTE — PLAN OF CARE
Problem: DISCHARGE PLANNING - CARE MANAGEMENT  Goal: Discharge to post-acute care or home with appropriate resources  INTERVENTIONS:  - Conduct assessment to determine patient/family and health care team treatment goals, and need for post-acute services based on payer coverage, community resources, and patient preferences, and barriers to discharge  - Address psychosocial, clinical, and financial barriers to discharge as identified in assessment in conjunction with the patient/family and health care team  - Arrange appropriate level of post-acute services according to patient's   needs and preference and payer coverage in collaboration with the physician and health care team  - Communicate with and update the patient/family, physician, and health care team regarding progress on the discharge plan  - Arrange appropriate transportation to post-acute venues   Outcome: Progressing  CM met with pt at bedside and she is agreeable to [facility]  [facility] s/w pt's daughter in law, Reggie Witt to provide update and she will  pt after work today  CM updated nurse  Pt has no other needs at this time

## 2018-05-17 NOTE — OCCUPATIONAL THERAPY NOTE
Occupational Therapy Evaluation      [facility]    5/17/2018    Patient Active Problem List   Diagnosis    Weakness    Type 2 diabetes mellitus ([facility] [address] )    Hypertension       Past Medical History:   Diagnosis Date    Cardiac disease     Diabetes mellitus ([facility] [address] )     Hyperlipidemia     Hypertension        Past Surgical History:   Procedure Laterality Date    ANGIOPLASTY      UTERINE FIBROID SURGERY        05/17/18 0933   Note Type   Note type Eval/Treat   Restrictions/Precautions   Weight Bearing Precautions Per Order No   Other Precautions Chair Alarm; Bed Alarm; Fall Risk   Pain Assessment   Pain Assessment 0-10   Pain Score No Pain   Home Living   Type of [address] One level;Elevator  (pt reports apt is about 15 floors but functions on 1 floor)   Bathroom Shower/Tub Tub/shower unit  (pt reports she only sponge bathes)   Bathroom Toilet Standard   Bathroom Equipment Shower chair;Grab bars around toilet;Grab bars in shower   Bathroom Accessibility Not accessible   Home Equipment Cane  (rollator)   Prior Function   Level of [place] Needs assistance with IADLs  (ambulates c rollator; reports her dtr setsup sponge bathin)   Lives With [address] [facility] Help From Family  (dtr lives nearby + son live in [place])   ADL Assistance Independent   IADLs Needs assistance   Falls in the last 6 months 0   Vocational Retired   Lifestyle   Autonomy Pt reports living alone in a high rise apt with elevator  Pt reports A from local dtr but unsure if she will be going home with same setup  Reciprocal Relationships dtr local & son in [place]   Psychosocial   Psychosocial (WDL) WDL   ADL   Eating Assistance 5  Supervision/Setup   Eating Deficit Setup; Increased time to complete;Supervision/safety   Grooming Assistance 4  Minimal Assistance   Grooming Deficit Setup;Steadying; Increased time to complete;Supervision/safety   UB Bathing Assistance 4  Minimal Assistance   UB Bathing Deficit Setup;Steadying;Supervision/safety; Increased time to complete   LB Bathing Assistance 3  Moderate Assistance   LB Bathing Deficit Setup;Steadying; Increased time to complete;Supervision/safety   UB Dressing Assistance 4  Minimal Assistance   UB Dressing Deficit Setup;Steadying;Supervision/safety; Increased time to complete   LB Dressing Assistance 3  Moderate Assistance   LB Dressing Deficit Setup;Steadying;Supervision/safety; Increased time to complete   Toileting Assistance  4  Minimal Assistance   Toileting Deficit Setup;Steadying;Supervison/safety; Increased time to complete   Functional Assistance 3  Moderate Assistance   Functional Deficit Setup;Steadying; Increased time to complete;Supervision/safety   Bed Mobility   Rolling R Unable to assess  (Pt OOB when Ot arrived)   Transfers   Sit to Stand 5  Supervision   Additional items Assist x 1;Bedrails; Increased time required   Stand to Sit 5  Supervision   Additional items Assist x 1;Bedrails; Increased time required   Functional Mobility   Functional Mobility 4  Minimal assistance   Additional Comments RW    Balance   Static Sitting Good   Dynamic Sitting Good   Static Standing Fair -   Dynamic Standing Fair -   Ambulatory Fair -   Activity Tolerance   Activity Tolerance Patient limited by fatigue   Nurse Made Aware Yes, spoke with pt's RN, Ladonna Macario, who stated pt is appropriate for OT and made aware of outcomes   RUE Assessment   RUE Assessment WFL  (4/5)   LUE Assessment   LUE Assessment WFL  (4/5)   Hand Function   Gross Motor Coordination Functional   Fine Motor Coordination Functional   Sensation   Light Touch No apparent deficits   Sharp/Dull No apparent deficits   Stereognosis No apparent deficits   Proprioception   Proprioception No apparent deficits   Vision-Basic Assessment   Current Vision Wears glasses only for reading  (Pt left glasses at home)   Vision - Complex Assessment   Ocular Range of Motion [facility]   Perception   Inattention/Neglect Appears intact Cognition   Overall Cognitive Status WFL   Arousal/Participation Alert; Cooperative;Arousable   Attention Within functional limits   Orientation Level Oriented X4   Memory Within functional limits   Following Commands Follows all commands and directions without difficulty   Comments Pt was agreeable to OT eval   Assessment   Limitation Decreased ADL status; Decreased UE strength;Decreased Safe judgement during ADL;Decreased endurance;Decreased self-care trans;Decreased high-level ADLs   Prognosis Good   Assessment Pt is a 68 y o  female seen for OT evaluation s/p admit to [facility] on 5/15/2018 w/ Type 2 diabetes mellitus ([facility] [address] )  Performed at least 2 patient identifiers during session: Name and wristband  Comorbidities affecting pt's functional performance at time of assessment include:DM, HTN and weakness   Personal factors affecting pt at time of IE include:limited home support, behavioral pattern, difficulty performing ADLS, difficulty performing IADLS , limited insight into deficits, compliance, decreased initiation and engagement , health management  and environment  Prior to admission, pt reports she was Ind with dressing, setup A with bathing, and toileting with MI (in recent months), but reports 3 months ago she was MI with all ADLs  Pt reprots she shares IADLs with dtr or struggles through  Pt does not drive  Upon evaluation: Pt requires min A with UB ADLs, mod A with LB ADLs, min A with xfers and min A with functional mobility 2* the following deficits impacting occupational performance: weakness, decreased strength, decreased balance, decreased tolerance, impaired problem solving, decreased safety awareness, impaired interpersonal skills, decreased coping skills, decreased mobilty and environmental deficits  Pt to benefit from continued skilled OT tx while in the hospital to address deficits as defined above and maximize level of functional independence w ADL's and functional mobility  Occupational Performance areas to address include: bathing/shower, toilet hygiene, dressing, medication management, socialization, health maintenance, functional mobility, clothing management, meal prep, household maintenance and social participation  From OT standpoint, recommendation at time of d/c would be STR vs home with OT (pending progress)  Functional Transfer Goals   Pt Will Transfer To Bedside Commode With mod indep   Pt Will Transfer To Toilet With mod indep   Pt Will Transfer To Shower With mod indep   ADL Goals   Pt Will Perform Eating With mod indep   Pt Will Perform Grooming With mod indep   Pt Will Perform Bathing With stand by assist   Pt Will Perform UE Dressing With stand by assist   Pt Will Perform LE Dressing With stand by assist   Pt Will Perform Toileting With stand by assist   Plan   Treatment Interventions ADL retraining;Functional transfer training;UE strengthening/ROM; Endurance training;Patient/family training;Equipment evaluation/education; Compensatory technique education;Continued evaluation;Cardiac education; Energy conservation; Activityengagement   Goal Expiration Date 05/30/18   OT Frequency 3-5x/wk   Recommendation   OT Discharge Recommendation Other (Comment)  (home with OT vs STR)   OT - OK to Discharge Yes  (when medically cleared)   Barthel Index   Feeding 5   Bathing 0   Grooming Score 5   Dressing Score 5   Bladder Score 10   Bowels Score 10   Toilet Use Score 5   Transfers (Bed/Chair) Score 10   Mobility (Level Surface) Score 10   Stairs Score 0   Barthel Index Score 60   Modified Porter Scale   Modified Porter Scale 3   Artemio Pina MS OTR/L

## 2018-05-17 NOTE — SOCIAL WORK
LOS: 1 CM met with pt and nurse at bedside to discuss [facility]  Pt agreeable and open to first available agency  CM sent referrals

## 2018-05-17 NOTE — DISCHARGE SUMMARY
Discharge- Pavan Block 1940, 68 y o  female MRN: [id number]    Unit/Bed#: -01 Encounter: [id number]    Primary Care Provider: No primary care provider on file  Date and time admitted to hospital: 5/15/2018  7:20 AM        * Type 2 diabetes mellitus ([address] )   Assessment & Plan    Secondary to uncontrolled diabetes, patient does not check her sugars at home and takes unknown amount of humulin  Continue Lantus, mealtime insulin with sliding scale  Blood sugars getting better  · Q i d  blood glucose checks, consistent carb diet, nutrition consult  · A1c 10%  · Close outpatient follow-up with PCP after discharge        Weakness   Assessment & Plan    Multifactorial, likely largely driven by the hyperglycemia, deconditioning    · Fall precaution  · Ambulate with rolling walker  · PT/OT recommended home therapy        Hypertension   Assessment & Plan    BP stable continue home medications              Discharging Physician / Practitioner: Jaron Perez MD  PCP: No primary care provider on file  Admission Date:   Admission Orders     Ordered        05/16/18 1339  Inpatient Admission  Once         05/15/18 1038  Place in Observation (expected length of stay for this patient is less than two midnights)  Once             Discharge Date: 05/17/18    Resolved Problems  Date Reviewed: 5/17/2018    None          Consultations During Hospital Stay:  · PT/OT    Procedures Performed:   None    Significant Findings / Test Results:   A1c 10%    Incidental Findings:   · None    Test Results Pending at Discharge (will require follow up): · None     Outpatient Tests Requested:  · None    Complications:  None    Reason for Admission:  Generalized weakness and hyperglycemia    Hospital Course:     Pavan Block is a 68 y o  female patient with past medical history of diabetes, history of noncompliance who originally presented to the hospital on 5/15/2018 due to generalized weakness    She was found to be hyperglycemic, noted that she has not been taking her insulin and medications as instructed  On admission blood sugars were elevated and 400 to 500s, was placed on long-acting as well as mealtime insulin which helped to get the blood sugars down  Patient is discharged on Lantus 20 units, NovoLog 10 units with meals and metformin  Patient is counseled on importance of blood sugar control and compliance with medications  I did call patient's PCPs office requested for an appointment in 1 week for follow-up  They will be calling patient's daughter-in law to schedule an appointment  Please see above list of diagnoses and related plan for additional information  Condition at Discharge: stable     Discharge Day Visit / Exam:     Subjective:    Patient seen and examined  Denies any complaints at this time  Vitals: Blood Pressure: 160/69 (05/17/18 0700)  Pulse: 61 (05/17/18 0700)  Temperature: 98 3 °F (36 8 °C) (05/17/18 0700)  Temp Source: Oral (05/17/18 0700)  Respirations: 18 (05/17/18 0700)  Height: 5' 8" (172 7 cm) (05/15/18 1507)  Weight - Scale: 89 9 kg (198 lb 3 1 oz) (05/15/18 1507)  SpO2: 96 % (05/17/18 0700)  Exam:   Physical Exam   Constitutional: She is oriented to person, place, and time  She appears well-developed and well-nourished  No distress  HENT:   Head: Normocephalic and atraumatic  Mouth/Throat: Oropharynx is clear and moist    Eyes: EOM are normal  Pupils are equal, round, and reactive to light  Neck: Normal range of motion  Neck supple  Cardiovascular: Normal rate and regular rhythm  Pulmonary/Chest: [place] normal and breath sounds normal    Abdominal: Soft  Bowel sounds are normal    Musculoskeletal: Normal range of motion  Neurological: She is alert and oriented to person, place, and time  Skin: Skin is warm and dry         Discussion with Family:     Discharge instructions/Information to patient and family:   See after visit summary for information provided to patient and family  Provisions for Follow-Up Care:  See after visit summary for information related to follow-up care and any pertinent home health orders  Disposition:     Home with VNA Services (Reminder: Complete face to face encounter)    For Discharges to [facility]:   · Not Applicable to this Patient - Not Applicable to this Patient    Planned Readmission:  None     Discharge Statement:  I spent 35 minutes discharging the patient  This time was spent on the day of discharge  I had direct contact with the patient on the day of discharge  Greater than 50% of the total time was spent examining patient, answering all patient questions, arranging and discussing plan of care with patient as well as directly providing post-discharge instructions  Additional time then spent on discharge activities  Discharge Medications:  See after visit summary for reconciled discharge medications provided to patient and family        ** Please Note: This note has been constructed using a voice recognition system **

## 2018-05-17 NOTE — SOCIAL WORK
ROME met with pt at bedside and she is agreeable to [facility]  ROME s/w pt's daughter in law, Elder Rumpf to provide update and she will  pt after work today  ROME updated nurse  Pt has no other needs at this time

## 2018-05-17 NOTE — PLAN OF CARE
Problem: OCCUPATIONAL THERAPY ADULT  Goal: Performs self-care activities at highest level of function for planned discharge setting  See evaluation for individualized goals  Treatment Interventions: ADL retraining, Functional transfer training, UE strengthening/ROM, Endurance training, Patient/family training, Equipment evaluation/education, Compensatory technique education, Continued evaluation, Cardiac education, Energy conservation, Activityengagement          See flowsheet documentation for full assessment, interventions and recommendations  Limitation: Decreased ADL status, Decreased UE strength, Decreased Safe judgement during ADL, Decreased endurance, Decreased self-care trans, Decreased high-level ADLs  Prognosis: Good  Assessment: Pt is a 68 y o  female seen for OT evaluation s/p admit to [facility] on 5/15/2018 w/ Type 2 diabetes mellitus ([facility] [address] )  Performed at least 2 patient identifiers during session: Name and wristband  Comorbidities affecting pt's functional performance at time of assessment include:DM, HTN and weakness   Personal factors affecting pt at time of IE include:limited home support, behavioral pattern, difficulty performing ADLS, difficulty performing IADLS , limited insight into deficits, compliance, decreased initiation and engagement , health management  and environment  Prior to admission, pt reports she was Ind with dressing, setup A with bathing, and toileting with MI (in recent months), but reports 3 months ago she was MI with all ADLs  Pt reprots she shares IADLs with dtr or struggles through  Pt does not drive   Upon evaluation: Pt requires min A with UB ADLs, mod A with LB ADLs, min A with xfers and min A with functional mobility 2* the following deficits impacting occupational performance: weakness, decreased strength, decreased balance, decreased tolerance, impaired problem solving, decreased safety awareness, impaired interpersonal skills, decreased coping skills, decreased mobilty and environmental deficits  Pt to benefit from continued skilled OT tx while in the hospital to address deficits as defined above and maximize level of functional independence w ADL's and functional mobility  Occupational Performance areas to address include: bathing/shower, toilet hygiene, dressing, medication management, socialization, health maintenance, functional mobility, clothing management, meal prep, household maintenance and social participation  From OT standpoint, recommendation at time of d/c would be STR vs home with OT (pending progress)         OT Discharge Recommendation: Other (Comment) (home with OT vs STR)  OT - OK to Discharge: Yes (when medically cleared)

## 2018-05-17 NOTE — PROGRESS NOTES
[address] [facility] Internal Medicine Progress Note  Patient: Karly Sanon 68 y o  female   MRN: [id number]  PCP: No primary care provider on file  Unit/Bed#: -01 Encounter: [id number]  Date Of Visit: 18    Assessment:    Principal Problem:    Type 2 diabetes mellitus ([address] )  Active Problems:    Weakness    Hypertension      Plan:  Uncontrolled type 2 diabetes  A1c 10%  Continue Lantus mealtime insulin  Monitor blood sugars  Close outpatient follow-up with PCP after discharge    Generalized weakness/deconditioning  PT/OT  Fall precautions    Hypertension  Blood pressure is stable continue medications      VTE Pharmacologic Prophylaxis:   Pharmacologic: Heparin  Mechanical VTE Prophylaxis in Place: Yes    Patient Centered Rounds: I have performed bedside rounds with nursing staff today  Discussions with Specialists or Other Care Team Provider: CM    Education and Discussions with Family / Patient: patient  Attempted to reach patient's family multiple times, could not reach them  Time Spent for Care: 20 minutes  More than 50% of total time spent on counseling and coordination of care as described above  Current Length of Stay: 1 day(s)    Current Patient Status: Inpatient   Certification Statement: The patient will continue to require additional inpatient hospital stay due to Management of hyperglycemia/ambulatory dysfunction/weakness    Discharge Plan:  Likely discharge in next 24 hr    Code Status: Level 1 - Full Code      Subjective:   Patient seen and examined  Denies any complaints at this time  She feels better overall  Objective:     Vitals:   Temp (24hrs), Av 4 °F (36 9 °C), Min:98 2 °F (36 8 °C), Max:98 7 °F (37 1 °C)    HR:  [61-72] 61  Resp:  [17-18] 18  BP: (160-165)/(69-72) 160/69  SpO2:  [96 %-100 %] 96 %  Body mass index is 30 14 kg/m²  Input and Output Summary (last 24 hours):        Intake/Output Summary (Last 24 hours) at 18 0904  Last data filed at 18 3620 Gross per 24 hour   Intake              240 ml   Output             1300 ml   Net            -1060 ml       Physical Exam:     Alert, oriented x3  Mucous membranes moist  Lungs are clear to auscultation  Heart sounds regular S1-S2 normal  Abdomen soft and nontender  Extremities no edema    Additional Data:     Labs:      Results from last 7 days  Lab Units 05/15/18  1658 05/15/18  0806   WBC Thousand/uL  --  4 81   HEMOGLOBIN g/dL  --  13 7   HEMATOCRIT %  --  39 3   PLATELETS Thousands/uL 177 181   NEUTROS PCT %  --  47   LYMPHS PCT %  --  46*   MONOS PCT %  --  5   EOS PCT %  --  2       Results from last 7 days  Lab Units 05/16/18  0426 05/15/18  0806   SODIUM mmol/L 138 132*   POTASSIUM mmol/L 3 9 4 2   CHLORIDE mmol/L 102 96*   CO2 mmol/L 28 31   BUN mg/dL 15 21   CREATININE mg/dL 0 90 1 15   CALCIUM mg/dL 9 6 10 6*   TOTAL PROTEIN g/dL  --  8 9*   BILIRUBIN TOTAL mg/dL  --  0 90   ALK PHOS U/L  --  103   ALT U/L  --  19   AST U/L  --  13   GLUCOSE RANDOM mg/dL 241* 491*           * I Have Reviewed All Lab Data Listed Above  * Additional Pertinent Lab Tests Reviewed:  [address] Admission Reviewed    Imaging:    Imaging Reports Reviewed Today Include:   Imaging Personally Reviewed by Myself Includes:      Recent Cultures (last 7 days):           Last 24 Hours Medication List:     Current Facility-Administered Medications:  acetaminophen 650 mg Oral Q6H PRN Regine Paul PA-C   calcium carbonate 1,000 mg Oral Daily PRN Brenda Daniel PA-C   docusate sodium 100 mg Oral BID Brenda Daniel PA-C   heparin (porcine) 5,000 Units Subcutaneous Q8H [address] Brenda Daniel PA-C   influenza vaccine 0 5 mL Intramuscular Prior to discharge Regine Paul PA-C   insulin glargine 20 Units Subcutaneous HS Rudy Cowan MD   insulin lispro 1-5 Units Subcutaneous 4x Daily (AC & HS) Brenda Daniel PA-C   insulin lispro 10 Units Subcutaneous TID With Meals Rudy Cowan MD   pneumococcal 13-valent conjugate vaccine 0 5 mL Intramuscular Prior to discharge Rafael Miller PA-C   polyethylene glycol 17 g Oral Daily Wm Mcgraw MD   senna 1 tablet Oral HS Wm Mcgraw MD        Today, Patient Was Seen By: Wm Mcgraw MD    ** Please Note: Dragon 360 Dictation voice to text software may have been used in the creation of this document   **

## 2018-05-17 NOTE — PLAN OF CARE
Problem: DISCHARGE PLANNING - CARE MANAGEMENT  Goal: Discharge to post-acute care or home with appropriate resources  INTERVENTIONS:  - Conduct assessment to determine patient/family and health care team treatment goals, and need for post-acute services based on payer coverage, community resources, and patient preferences, and barriers to discharge  - Address psychosocial, clinical, and financial barriers to discharge as identified in assessment in conjunction with the patient/family and health care team  - Arrange appropriate level of post-acute services according to patient's   needs and preference and payer coverage in collaboration with the physician and health care team  - Communicate with and update the patient/family, physician, and health care team regarding progress on the discharge plan  - Arrange appropriate transportation to post-acute venues   Outcome: Progressing  LOS: 1 CM met with pt and nurse at bedside to discuss [facility]  Pt agreeable and open to first available agency  CM sent referrals

## 2018-05-18 NOTE — PHYSICIAN ADVISOR
Current patient class: Inpatient  The patient is currently on Hospital Day: 3      The patient was admitted to the hospital at [id number] on 5/16/18 for the following diagnosis:  Weakness [R53 1]  Hypertension [I10]  Hyperglycemia [R73 9]       There is documentation in the medical record of an expected length of stay of at least 2 midnights  The patient is therefore expected to satisfy the 2 midnight benchmark and given the 2 midnight presumption is appropriate for INPATIENT ADMISSION  Given this expectation of a satisfying stay, CMS instructs us that the patient is most often appropriate for inpatient admission under part A provided medical necessity is documented in the chart  After review of the relevant documentation, labs, vital signs and test results, the patient is appropriate for INPATIENT ADMISSION  Admission to the hospital as an inpatient is a complex decision making process which requires the practitioner to consider the patients presenting complaint, history and physical examination and all relevant testing  With this in mind, in this case, the patient was deemed appropriate for INPATIENT ADMISSION  After review of the documentation and testing available at the time of the admission I concur with this clinical determination of medical necessity  Rationale is as follows: The patient is a 66yo female with past medical hx of HTN,DM, hyperlipidemia and CAD who presents due to weakness and hyperglycemia  Pt presented to ER with generalized weakness and elevated blood sugars in the 400's from suspected noncompliance with her insulin  Upon initial eal pt nor family members could given any insight into how much insulin pt was taking daily or why she was recently started on an oral antibiotic as an outpt  Hgb A1c sent and pt started on lantus and humalog - both increased during stay due to continued uncontrolled blood sugars   Pt continued getting QID blood sugar checks, consistent carb diet and followed by nutritionist  For the generalized weakness, PT/OT were consulted  After reviewing this patients current stay, I agree that inpatient services were medically necessary for this patient for a duration of greater than 2 midnights      The patients vitals on arrival were ED Triage Vitals   Temperature Pulse Respirations Blood Pressure SpO2   05/15/18 0734 05/15/18 0734 05/15/18 0734 05/15/18 0734 05/15/18 1045   98 3 °F (36 8 °C) 62 16 159/65 97 %      Temp Source Heart Rate Source Patient Position - Orthostatic VS BP Location FiO2 (%)   05/15/18 0734 05/15/18 0734 05/15/18 0734 05/15/18 0734 --   Oral Monitor Lying Right arm       Pain Score       05/15/18 1126       No Pain           Past Medical History:   Diagnosis Date    Cardiac disease     [facility] mellitus ([facility] [address] [facility]     Hyperlipidemia     Hypertension      Past Surgical History:   Procedure Laterality Date    ANGIOPLASTY      UTERINE FIBROID SURGERY             Consults have been placed to:   IP CONSULT TO NUTRITION SERVICES  IP CONSULT TO CASE MANAGEMENT  IP CONSULT TO PODIATRY    Vitals:    05/16/18 1525 05/16/18 2300 05/17/18 0700 05/17/18 1501   BP: 163/72 165/70 160/69 164/72   BP Location: Right arm Right arm Left arm Left arm   Pulse: 64 72 61 64   Resp: 18 17 18 18   Temp: 98 7 °F (37 1 °C) 98 2 °F (36 8 °C) 98 3 °F (36 8 °C) 97 9 °F (36 6 °C)   TempSrc: Oral Oral Oral Oral   SpO2: 100% 96% 96% 100%   Weight:       Height:           Most recent labs:    Recent Labs      05/15/18   0806  05/15/18   1658  05/16/18   0426   WBC  4 81   --    --    HGB  13 7   --    --    HCT  39 3   --    --    PLT  181  177   --    K  4 2   --   3 9   NA  132*   --   138   CALCIUM  10 6*   --   9 6   BUN  21   --   15   CREATININE  1 15   --   0 90   LIPASE  75   --    --    TROPONINI  <0 02   --    --    AST  13   --    --    ALT  19   --    --    ALKPHOS  103   --    --    BILITOT  0 90   --    --        Scheduled Meds:  Current Facility-Administered Medications:  acetaminophen 650 mg Oral Q6H PRN Morales Leal PA-C   calcium carbonate 1,000 mg Oral Daily PRN Brenda Daniel PA-C   docusate sodium 100 mg Oral BID Brenda Daniel PA-C   heparin (porcine) 5,000 Units Subcutaneous Q8H [facility] Brenda Daniel PA-C   insulin glargine 20 Units Subcutaneous HS Jermain Sanders MD   insulin lispro 1-5 Units Subcutaneous 4x Daily (AC & HS) Brenda Daniel PA-C   insulin lispro 10 Units Subcutaneous TID With Meals Jermain Sanders MD   polyethylene glycol 17 g Oral Daily Jermain Sanders MD   senna 1 tablet Oral HS Jermain Sanders MD     Continuous Infusions:   PRN Meds:   acetaminophen    calcium carbonate    Surgical procedures (if appropriate):

## 2018-11-26 ENCOUNTER — APPOINTMENT (OUTPATIENT)
Dept: NUCLEAR MEDICINE | Facility: HOSPITAL | Age: 78
DRG: 644 | End: 2018-11-26
Payer: MEDICARE

## 2018-11-26 ENCOUNTER — HOSPITAL ENCOUNTER (INPATIENT)
Facility: HOSPITAL | Age: 78
LOS: 5 days | DRG: 644 | End: 2018-12-02
Attending: EMERGENCY MEDICINE | Admitting: INTERNAL MEDICINE
Payer: MEDICARE

## 2018-11-26 ENCOUNTER — APPOINTMENT (EMERGENCY)
Dept: RADIOLOGY | Facility: HOSPITAL | Age: 78
DRG: 644 | End: 2018-11-26
Payer: MEDICARE

## 2018-11-26 ENCOUNTER — APPOINTMENT (OUTPATIENT)
Dept: NON INVASIVE DIAGNOSTICS | Facility: HOSPITAL | Age: 78
DRG: 644 | End: 2018-11-26
Payer: MEDICARE

## 2018-11-26 DIAGNOSIS — I10 HYPERTENSION: ICD-10-CM

## 2018-11-26 DIAGNOSIS — R79.89 ELEVATED TSH: ICD-10-CM

## 2018-11-26 DIAGNOSIS — E11.9 DIABETES MELLITUS (HCC): ICD-10-CM

## 2018-11-26 DIAGNOSIS — E21.3 HYPERPARATHYROIDISM (HCC): ICD-10-CM

## 2018-11-26 DIAGNOSIS — R07.9 CHEST PAIN: Primary | ICD-10-CM

## 2018-11-26 DIAGNOSIS — E83.52 HYPERCALCEMIA: ICD-10-CM

## 2018-11-26 LAB
ALBUMIN SERPL BCP-MCNC: 3.4 G/DL (ref 3.5–5)
ALP SERPL-CCNC: 89 U/L (ref 46–116)
ALT SERPL W P-5'-P-CCNC: 23 U/L (ref 12–78)
ANION GAP SERPL CALCULATED.3IONS-SCNC: 7 MMOL/L (ref 4–13)
APTT PPP: 25 SECONDS (ref 26–38)
ARRHY DURING EX: NORMAL
AST SERPL W P-5'-P-CCNC: 14 U/L (ref 5–45)
ATRIAL RATE: 63 BPM
BACTERIA UR QL AUTO: ABNORMAL /HPF
BASOPHILS # BLD AUTO: 0.04 THOUSANDS/ΜL (ref 0–0.1)
BASOPHILS NFR BLD AUTO: 1 % (ref 0–1)
BILIRUB SERPL-MCNC: 1 MG/DL (ref 0.2–1)
BILIRUB UR QL STRIP: NEGATIVE
BUN SERPL-MCNC: 18 MG/DL (ref 5–25)
CALCIUM SERPL-MCNC: 10.4 MG/DL (ref 8.3–10.1)
CHEST PAIN STATEMENT: NORMAL
CHLORIDE SERPL-SCNC: 98 MMOL/L (ref 100–108)
CHOLEST SERPL-MCNC: 258 MG/DL (ref 50–200)
CLARITY UR: CLEAR
CO2 SERPL-SCNC: 32 MMOL/L (ref 21–32)
COLOR UR: YELLOW
CREAT SERPL-MCNC: 1.02 MG/DL (ref 0.6–1.3)
EOSINOPHIL # BLD AUTO: 0.13 THOUSAND/ΜL (ref 0–0.61)
EOSINOPHIL NFR BLD AUTO: 2 % (ref 0–6)
ERYTHROCYTE [DISTWIDTH] IN BLOOD BY AUTOMATED COUNT: 11.8 % (ref 11.6–15.1)
EST. AVERAGE GLUCOSE BLD GHB EST-MCNC: 246 MG/DL
GFR SERPL CREATININE-BSD FRML MDRD: 61 ML/MIN/1.73SQ M
GLUCOSE SERPL-MCNC: 184 MG/DL (ref 65–140)
GLUCOSE SERPL-MCNC: 212 MG/DL (ref 65–140)
GLUCOSE SERPL-MCNC: 272 MG/DL (ref 65–140)
GLUCOSE SERPL-MCNC: 314 MG/DL (ref 65–140)
GLUCOSE UR STRIP-MCNC: ABNORMAL MG/DL
HBA1C MFR BLD: 10.2 % (ref 4.2–6.3)
HCT VFR BLD AUTO: 39.5 % (ref 34.8–46.1)
HDLC SERPL-MCNC: 52 MG/DL (ref 40–60)
HGB BLD-MCNC: 13.6 G/DL (ref 11.5–15.4)
HGB UR QL STRIP.AUTO: NEGATIVE
IMM GRANULOCYTES # BLD AUTO: 0.01 THOUSAND/UL (ref 0–0.2)
IMM GRANULOCYTES NFR BLD AUTO: 0 % (ref 0–2)
INR PPP: 0.97 (ref 0.86–1.17)
KETONES UR STRIP-MCNC: NEGATIVE MG/DL
LDLC SERPL CALC-MCNC: 179 MG/DL (ref 0–100)
LEUKOCYTE ESTERASE UR QL STRIP: NEGATIVE
LYMPHOCYTES # BLD AUTO: 3.4 THOUSANDS/ΜL (ref 0.6–4.47)
LYMPHOCYTES NFR BLD AUTO: 54 % (ref 14–44)
MAGNESIUM SERPL-MCNC: 1.8 MG/DL (ref 1.6–2.6)
MAX DIASTOLIC BP: 66 MMHG
MAX HEART RATE: 69 BPM
MAX PREDICTED HEART RATE: 142 BPM
MAX. SYSTOLIC BP: 161 MMHG
MCH RBC QN AUTO: 32.4 PG (ref 26.8–34.3)
MCHC RBC AUTO-ENTMCNC: 34.4 G/DL (ref 31.4–37.4)
MCV RBC AUTO: 94 FL (ref 82–98)
MONOCYTES # BLD AUTO: 0.36 THOUSAND/ΜL (ref 0.17–1.22)
MONOCYTES NFR BLD AUTO: 6 % (ref 4–12)
NEUTROPHILS # BLD AUTO: 2.3 THOUSANDS/ΜL (ref 1.85–7.62)
NEUTS SEG NFR BLD AUTO: 37 % (ref 43–75)
NITRITE UR QL STRIP: NEGATIVE
NON-SQ EPI CELLS URNS QL MICRO: ABNORMAL /HPF
NONHDLC SERPL-MCNC: 206 MG/DL
NRBC BLD AUTO-RTO: 0 /100 WBCS
OTHER STN SPEC: ABNORMAL
P AXIS: 70 DEGREES
PH UR STRIP.AUTO: 6 [PH] (ref 4.5–8)
PLATELET # BLD AUTO: 182 THOUSANDS/UL (ref 149–390)
PLATELET # BLD AUTO: 187 THOUSANDS/UL (ref 149–390)
PMV BLD AUTO: 11.1 FL (ref 8.9–12.7)
PMV BLD AUTO: 11.6 FL (ref 8.9–12.7)
POTASSIUM SERPL-SCNC: 3.8 MMOL/L (ref 3.5–5.3)
PR INTERVAL: 170 MS
PROT SERPL-MCNC: 8.4 G/DL (ref 6.4–8.2)
PROT UR STRIP-MCNC: ABNORMAL MG/DL
PROTHROMBIN TIME: 12.8 SECONDS (ref 11.8–14.2)
PROTOCOL NAME: NORMAL
PTH-INTACT SERPL-MCNC: 103.3 PG/ML (ref 18.4–80.1)
QRS AXIS: -71 DEGREES
QRSD INTERVAL: 166 MS
QT INTERVAL: 470 MS
QTC INTERVAL: 480 MS
RBC # BLD AUTO: 4.2 MILLION/UL (ref 3.81–5.12)
RBC #/AREA URNS AUTO: ABNORMAL /HPF
REASON FOR TERMINATION: NORMAL
SODIUM SERPL-SCNC: 137 MMOL/L (ref 136–145)
SP GR UR STRIP.AUTO: 1.02 (ref 1–1.03)
T WAVE AXIS: 107 DEGREES
T3 SERPL-MCNC: 0.8 NG/ML (ref 0.6–1.8)
T3FREE SERPL-MCNC: 2.61 PG/ML (ref 2.3–4.2)
T4 FREE SERPL-MCNC: 1.09 NG/DL (ref 0.76–1.46)
T4 FREE SERPL-MCNC: 1.11 NG/DL (ref 0.76–1.46)
TARGET HR FORMULA: NORMAL
TEST INDICATION: NORMAL
TIME IN EXERCISE PHASE: NORMAL
TRIGL SERPL-MCNC: 133 MG/DL
TROPONIN I SERPL-MCNC: 0.02 NG/ML
TROPONIN I SERPL-MCNC: 0.02 NG/ML
TROPONIN I SERPL-MCNC: 0.03 NG/ML
TROPONIN I SERPL-MCNC: <0.02 NG/ML
TSH SERPL DL<=0.05 MIU/L-ACNC: 5.33 UIU/ML (ref 0.36–3.74)
UROBILINOGEN UR QL STRIP.AUTO: 0.2 E.U./DL
VENTRICULAR RATE: 63 BPM
WBC # BLD AUTO: 6.24 THOUSAND/UL (ref 4.31–10.16)
WBC #/AREA URNS AUTO: ABNORMAL /HPF

## 2018-11-26 PROCEDURE — 83970 ASSAY OF PARATHORMONE: CPT | Performed by: INTERNAL MEDICINE

## 2018-11-26 PROCEDURE — 82948 REAGENT STRIP/BLOOD GLUCOSE: CPT

## 2018-11-26 PROCEDURE — 84480 ASSAY TRIIODOTHYRONINE (T3): CPT | Performed by: PHYSICIAN ASSISTANT

## 2018-11-26 PROCEDURE — 93010 ELECTROCARDIOGRAM REPORT: CPT | Performed by: INTERNAL MEDICINE

## 2018-11-26 PROCEDURE — 93306 TTE W/DOPPLER COMPLETE: CPT | Performed by: INTERNAL MEDICINE

## 2018-11-26 PROCEDURE — 93016 CV STRESS TEST SUPVJ ONLY: CPT | Performed by: INTERNAL MEDICINE

## 2018-11-26 PROCEDURE — 1124F ACP DISCUSS-NO DSCNMKR DOCD: CPT | Performed by: INTERNAL MEDICINE

## 2018-11-26 PROCEDURE — 84481 FREE ASSAY (FT-3): CPT | Performed by: INTERNAL MEDICINE

## 2018-11-26 PROCEDURE — 93017 CV STRESS TEST TRACING ONLY: CPT

## 2018-11-26 PROCEDURE — 83036 HEMOGLOBIN GLYCOSYLATED A1C: CPT | Performed by: INTERNAL MEDICINE

## 2018-11-26 PROCEDURE — 71046 X-RAY EXAM CHEST 2 VIEWS: CPT

## 2018-11-26 PROCEDURE — 78452 HT MUSCLE IMAGE SPECT MULT: CPT

## 2018-11-26 PROCEDURE — 85049 AUTOMATED PLATELET COUNT: CPT | Performed by: INTERNAL MEDICINE

## 2018-11-26 PROCEDURE — 85730 THROMBOPLASTIN TIME PARTIAL: CPT | Performed by: PHYSICIAN ASSISTANT

## 2018-11-26 PROCEDURE — 93005 ELECTROCARDIOGRAM TRACING: CPT

## 2018-11-26 PROCEDURE — 80053 COMPREHEN METABOLIC PANEL: CPT | Performed by: PHYSICIAN ASSISTANT

## 2018-11-26 PROCEDURE — 81001 URINALYSIS AUTO W/SCOPE: CPT | Performed by: PHYSICIAN ASSISTANT

## 2018-11-26 PROCEDURE — 83735 ASSAY OF MAGNESIUM: CPT | Performed by: PHYSICIAN ASSISTANT

## 2018-11-26 PROCEDURE — 84439 ASSAY OF FREE THYROXINE: CPT | Performed by: INTERNAL MEDICINE

## 2018-11-26 PROCEDURE — 99204 OFFICE O/P NEW MOD 45 MIN: CPT | Performed by: INTERNAL MEDICINE

## 2018-11-26 PROCEDURE — 78452 HT MUSCLE IMAGE SPECT MULT: CPT | Performed by: INTERNAL MEDICINE

## 2018-11-26 PROCEDURE — 84484 ASSAY OF TROPONIN QUANT: CPT | Performed by: INTERNAL MEDICINE

## 2018-11-26 PROCEDURE — 93018 CV STRESS TEST I&R ONLY: CPT | Performed by: INTERNAL MEDICINE

## 2018-11-26 PROCEDURE — 84443 ASSAY THYROID STIM HORMONE: CPT | Performed by: PHYSICIAN ASSISTANT

## 2018-11-26 PROCEDURE — 99285 EMERGENCY DEPT VISIT HI MDM: CPT

## 2018-11-26 PROCEDURE — A9502 TC99M TETROFOSMIN: HCPCS

## 2018-11-26 PROCEDURE — 84439 ASSAY OF FREE THYROXINE: CPT | Performed by: PHYSICIAN ASSISTANT

## 2018-11-26 PROCEDURE — 85025 COMPLETE CBC W/AUTO DIFF WBC: CPT | Performed by: PHYSICIAN ASSISTANT

## 2018-11-26 PROCEDURE — 99219 PR INITIAL OBSERVATION CARE/DAY 50 MINUTES: CPT | Performed by: INTERNAL MEDICINE

## 2018-11-26 PROCEDURE — 80061 LIPID PANEL: CPT | Performed by: INTERNAL MEDICINE

## 2018-11-26 PROCEDURE — 84100 ASSAY OF PHOSPHORUS: CPT | Performed by: GENERAL PRACTICE

## 2018-11-26 PROCEDURE — 85610 PROTHROMBIN TIME: CPT | Performed by: PHYSICIAN ASSISTANT

## 2018-11-26 PROCEDURE — 36415 COLL VENOUS BLD VENIPUNCTURE: CPT | Performed by: PHYSICIAN ASSISTANT

## 2018-11-26 PROCEDURE — 84484 ASSAY OF TROPONIN QUANT: CPT | Performed by: PHYSICIAN ASSISTANT

## 2018-11-26 PROCEDURE — 93306 TTE W/DOPPLER COMPLETE: CPT

## 2018-11-26 RX ORDER — ACETAMINOPHEN 325 MG/1
650 TABLET ORAL EVERY 8 HOURS PRN
Status: DISCONTINUED | OUTPATIENT
Start: 2018-11-26 | End: 2018-12-02 | Stop reason: HOSPADM

## 2018-11-26 RX ORDER — NITROGLYCERIN 0.4 MG/1
0.4 TABLET SUBLINGUAL
Status: DISCONTINUED | OUTPATIENT
Start: 2018-11-26 | End: 2018-12-02 | Stop reason: HOSPADM

## 2018-11-26 RX ORDER — NITROGLYCERIN 0.4 MG/1
0.4 TABLET SUBLINGUAL ONCE
Status: COMPLETED | OUTPATIENT
Start: 2018-11-26 | End: 2018-11-26

## 2018-11-26 RX ORDER — SODIUM CHLORIDE 9 MG/ML
75 INJECTION, SOLUTION INTRAVENOUS CONTINUOUS
Status: DISCONTINUED | OUTPATIENT
Start: 2018-11-26 | End: 2018-11-27

## 2018-11-26 RX ORDER — DOCUSATE SODIUM 100 MG/1
100 CAPSULE, LIQUID FILLED ORAL 2 TIMES DAILY
Status: DISCONTINUED | OUTPATIENT
Start: 2018-11-26 | End: 2018-11-26 | Stop reason: SDUPTHER

## 2018-11-26 RX ORDER — SENNOSIDES 8.6 MG
1 TABLET ORAL DAILY
Status: DISCONTINUED | OUTPATIENT
Start: 2018-11-26 | End: 2018-12-02 | Stop reason: HOSPADM

## 2018-11-26 RX ORDER — DOCUSATE SODIUM 100 MG/1
100 CAPSULE, LIQUID FILLED ORAL 2 TIMES DAILY
Status: DISCONTINUED | OUTPATIENT
Start: 2018-11-26 | End: 2018-12-02 | Stop reason: HOSPADM

## 2018-11-26 RX ORDER — ONDANSETRON 2 MG/ML
4 INJECTION INTRAMUSCULAR; INTRAVENOUS EVERY 6 HOURS PRN
Status: DISCONTINUED | OUTPATIENT
Start: 2018-11-26 | End: 2018-12-02 | Stop reason: HOSPADM

## 2018-11-26 RX ORDER — ASPIRIN 81 MG/1
324 TABLET, CHEWABLE ORAL ONCE
Status: COMPLETED | OUTPATIENT
Start: 2018-11-26 | End: 2018-11-26

## 2018-11-26 RX ORDER — LISINOPRIL 20 MG/1
20 TABLET ORAL DAILY
Status: DISCONTINUED | OUTPATIENT
Start: 2018-11-27 | End: 2018-12-02 | Stop reason: HOSPADM

## 2018-11-26 RX ORDER — INSULIN GLARGINE 100 [IU]/ML
20 INJECTION, SOLUTION SUBCUTANEOUS
Status: DISCONTINUED | OUTPATIENT
Start: 2018-11-26 | End: 2018-12-02 | Stop reason: HOSPADM

## 2018-11-26 RX ORDER — HEPARIN SODIUM 5000 [USP'U]/ML
5000 INJECTION, SOLUTION INTRAVENOUS; SUBCUTANEOUS EVERY 8 HOURS SCHEDULED
Status: DISCONTINUED | OUTPATIENT
Start: 2018-11-26 | End: 2018-12-01

## 2018-11-26 RX ORDER — LISINOPRIL 10 MG/1
10 TABLET ORAL DAILY
Status: DISCONTINUED | OUTPATIENT
Start: 2018-11-26 | End: 2018-11-26

## 2018-11-26 RX ADMIN — REGADENOSON 0.4 MG: 0.08 INJECTION, SOLUTION INTRAVENOUS at 13:08

## 2018-11-26 RX ADMIN — METOPROLOL TARTRATE 25 MG: 25 TABLET, FILM COATED ORAL at 22:11

## 2018-11-26 RX ADMIN — INSULIN LISPRO 10 UNITS: 100 INJECTION, SOLUTION INTRAVENOUS; SUBCUTANEOUS at 17:03

## 2018-11-26 RX ADMIN — INSULIN LISPRO 1 UNITS: 100 INJECTION, SOLUTION INTRAVENOUS; SUBCUTANEOUS at 23:51

## 2018-11-26 RX ADMIN — METOPROLOL TARTRATE 25 MG: 25 TABLET, FILM COATED ORAL at 10:53

## 2018-11-26 RX ADMIN — HEPARIN SODIUM 5000 UNITS: 5000 INJECTION, SOLUTION INTRAVENOUS; SUBCUTANEOUS at 15:09

## 2018-11-26 RX ADMIN — SENNOSIDES 8.6 MG: 8.6 TABLET, FILM COATED ORAL at 17:15

## 2018-11-26 RX ADMIN — NITROGLYCERIN 1 INCH: 20 OINTMENT TOPICAL at 09:39

## 2018-11-26 RX ADMIN — INSULIN LISPRO 4 UNITS: 100 INJECTION, SOLUTION INTRAVENOUS; SUBCUTANEOUS at 10:53

## 2018-11-26 RX ADMIN — ACETAMINOPHEN 650 MG: 325 TABLET ORAL at 17:15

## 2018-11-26 RX ADMIN — NITROGLYCERIN 0.4 MG: 0.4 TABLET SUBLINGUAL at 06:21

## 2018-11-26 RX ADMIN — DOCUSATE SODIUM 100 MG: 100 CAPSULE, LIQUID FILLED ORAL at 17:15

## 2018-11-26 RX ADMIN — INSULIN LISPRO 2 UNITS: 100 INJECTION, SOLUTION INTRAVENOUS; SUBCUTANEOUS at 17:02

## 2018-11-26 RX ADMIN — INSULIN HUMAN 8 UNITS: 100 INJECTION, SOLUTION PARENTERAL at 08:00

## 2018-11-26 RX ADMIN — HEPARIN SODIUM 5000 UNITS: 5000 INJECTION, SOLUTION INTRAVENOUS; SUBCUTANEOUS at 23:30

## 2018-11-26 RX ADMIN — ASPIRIN 81 MG 324 MG: 81 TABLET ORAL at 06:19

## 2018-11-26 RX ADMIN — SODIUM CHLORIDE 75 ML/HR: 0.9 INJECTION, SOLUTION INTRAVENOUS at 23:36

## 2018-11-26 RX ADMIN — NITROGLYCERIN 0.4 MG: 0.4 TABLET SUBLINGUAL at 07:08

## 2018-11-26 NOTE — ASSESSMENT & PLAN NOTE
Admit observation services maintain telemetry  Cycle troponin x3, TSH, fasting lipid profile, TTE  Consult Cardiology  Will initiate aspirin, low-dose beta-blocker, continue lisinopril  Cardiac diet to ensue

## 2018-11-26 NOTE — Clinical Note
Case was discussed with Naren Monique and the patient's admission status was agreed to be Admission Status: observation status to the service of Dr Naren Monique

## 2018-11-26 NOTE — ED PROVIDER NOTES
History  Chief Complaint   Patient presents with    Chest Pain     onset last night, mid epigastric, burning up into left neck area     This 66year old black female presents emergency room with complaints of chest pain that radiates to the left side of her neck  She describes it as a burning sensation with a sharp component  She states that the sharp component is intermittent  The burning pain is present consistently  She complains of nausea with occasional diaphoresis  She has an occasional episode of shortness of breath with activity  She complains of a dry nonproductive cough  She denies any fever chills  She denies any nasal congestion or postnasal drip  She has a history of a pacemaker  She states she has had a stress test in  the past but she does not recall the date  She has a positive family history of heart disease  She has a past medical history that is positive for a pacemaker, MI, hypertension, diabetes mellitus, hyperlipidemia, uterine fibroids  Differential diagnosis includes but is not limited to acute coronary syndrome, myocarditis, pericarditis, pulmonary embolism, viral syndrome, acute bronchitis  History provided by:  Patient  History limited by: poor historian    Chest Pain   Pain location:  Substernal area  Pain quality: burning and sharp    Pain radiates to:  Neck  Pain radiates to the back: no    Pain severity:  Moderate  Onset quality:  Gradual  Duration:  8 hours  Timing:  Intermittent  Progression:  Unchanged  Chronicity:  New  Context: at rest    Context: not breathing, no drug use, not eating, no intercourse, not lifting, no movement, not raising an arm, no stress and no trauma    Relieved by:  Nothing  Worsened by:  Nothing tried  Ineffective treatments:  None tried  Associated symptoms: nausea and shortness of breath    Associated symptoms: no abdominal pain, no AICD problem, no altered mental status, no anorexia, no anxiety, no back pain, no claudication, no cough, no diaphoresis, no dizziness, no dysphagia, no fatigue, no fever, no headache, no heartburn, no lower extremity edema, no near-syncope, no numbness, no orthopnea, no palpitations, no PND, no syncope, not vomiting and no weakness    Risk factors: diabetes mellitus, hypertension and obesity    Risk factors: no aortic disease, no coronary artery disease, no Velasquez-Danlos syndrome, no high cholesterol, no immobilization, not male, no Marfan's syndrome, no prior DVT/PE, no smoking and no surgery        Prior to Admission Medications   Prescriptions Last Dose Informant Patient Reported? Taking? NEEDLE, DISP, 15 G 15G X 1-1/2" MISC 11/25/2018 at Unknown time  No Yes   Sig: by Does not apply route 4 (four) times a day   docusate sodium (COLACE) 100 mg capsule Unknown at Unknown time  No No   Sig: Take 1 capsule (100 mg total) by mouth 2 (two) times a day   insulin glargine (LANTUS SOLOSTAR) injection pen 100 units/mL 11/25/2018 at Unknown time  No Yes   Sig: Inject 0 2 mL (20 Units total) under the skin daily at bedtime   insulin lispro (HumaLOG) 100 Units/mL SOPN 11/25/2018 at Unknown time  No Yes   Sig: Inject 10 Units under the skin 3 (three) times a day with meals   lisinopril (ZESTRIL) 5 mg tablet Past Week at Unknown time  Yes Yes   Sig: Take 5 mg by mouth daily   metFORMIN (GLUCOPHAGE) 500 mg tablet 11/25/2018 at Unknown time  No Yes   Sig: Take 1 tablet (500 mg total) by mouth 2 (two) times a day with meals   polyethylene glycol (MIRALAX) 17 g packet Unknown at Unknown time  No No   Sig: Take 17 g by mouth daily      Facility-Administered Medications: None       Past Medical History:   Diagnosis Date    Cardiac disease     Diabetes mellitus ([address] )     Hyperlipidemia     Hypertension        Past Surgical History:   Procedure Laterality Date    ANGIOPLASTY      UTERINE FIBROID SURGERY         History reviewed  No pertinent family history    I have reviewed and agree with the history as documented  Social History   Substance Use Topics    Smoking status: Never Smoker    Smokeless tobacco: Never Used    Alcohol use No        Review of Systems   Constitutional: Positive for activity change  Negative for appetite change, chills, diaphoresis, fatigue and fever  HENT: Negative for congestion, dental problem, ear discharge, ear pain, hearing loss, mouth sores, postnasal drip, rhinorrhea, sore throat and trouble swallowing  Eyes: Negative for pain, discharge, redness and itching  Respiratory: Positive for shortness of breath  Negative for cough and chest tightness  Cardiovascular: Positive for chest pain and leg swelling  Negative for palpitations, orthopnea, claudication, syncope, PND and near-syncope  Gastrointestinal: Positive for nausea  Negative for abdominal pain, anorexia, heartburn and vomiting  Genitourinary: Positive for frequency and urgency  Negative for dysuria and hematuria  Urinary incontinence   Musculoskeletal: Negative for back pain, neck pain and neck stiffness  Skin: Negative for color change, pallor, rash and wound  Neurological: Negative for dizziness, weakness, numbness and headaches  Psychiatric/Behavioral: Negative for confusion  All other systems reviewed and are negative  Physical Exam  Physical Exam   Constitutional: She is oriented to person, place, and time  She appears well-developed and well-nourished  No distress  HENT:   Head: Normocephalic and atraumatic  Right Ear: External ear normal    Left Ear: External ear normal    Nose: Nose normal    Eyes: Conjunctivae are normal  Right eye exhibits no discharge  Left eye exhibits no discharge  Neck: Neck supple  No JVD present  No tracheal deviation present  No thyromegaly present  Cardiovascular: Normal rate, regular rhythm and normal heart sounds  Exam reveals no gallop and no friction rub  No murmur heard  Pulmonary/Chest: Effort normal and breath sounds normal  No stridor  No respiratory distress  She has no wheezes  She has no rales  Abdominal: Soft  She exhibits no distension and no mass  There is no tenderness  There is no rebound and no guarding  Musculoskeletal: She exhibits no edema  Lymphadenopathy:     She has no cervical adenopathy  Neurological: She is alert and oriented to person, place, and time  Skin: Skin is warm  Capillary refill takes less than 2 seconds  She is not diaphoretic  Psychiatric: She has a normal mood and affect  Her behavior is normal  Judgment and thought content normal    Nursing note and vitals reviewed        Vital Signs  ED Triage Vitals   Temperature Pulse Respirations Blood Pressure SpO2   11/26/18 0555 11/26/18 0555 11/26/18 0555 11/26/18 0601 11/26/18 0555   98 3 °F (36 8 °C) 86 18 (!) 205/81 96 %      Temp Source Heart Rate Source Patient Position - Orthostatic VS BP Location FiO2 (%)   11/26/18 0555 11/26/18 0555 11/26/18 0802 11/26/18 0802 --   Oral Monitor Lying Right arm       Pain Score       11/26/18 0557       8           Vitals:    11/26/18 0555 11/26/18 0601 11/26/18 0802   BP:  (!) 205/81 (!) 175/71   Pulse: 86  61   Patient Position - Orthostatic VS:   Lying       Visual Acuity      ED Medications  Medications   docusate sodium (COLACE) capsule 100 mg (not administered)   insulin glargine (LANTUS) subcutaneous injection 20 Units 0 2 mL (not administered)   insulin lispro (HumaLOG) 100 units/mL subcutaneous injection 10 Units (not administered)   sodium chloride 0 9 % infusion (not administered)   senna (SENOKOT) tablet 8 6 mg (not administered)   ondansetron (ZOFRAN) injection 4 mg (not administered)   heparin (porcine) subcutaneous injection 5,000 Units (not administered)   acetaminophen (TYLENOL) tablet 650 mg (not administered)   nitroglycerin (NITROSTAT) SL tablet 0 4 mg (not administered)   morphine injection 2 mg (not administered)   insulin lispro (HumaLOG) 100 units/mL subcutaneous injection 1-6 Units (not administered)   insulin lispro (HumaLOG) 100 units/mL subcutaneous injection 1-6 Units (not administered)   metoprolol tartrate (LOPRESSOR) tablet 25 mg (not administered)   nitroglycerin (NITRO-BID) 2 % TD ointment 1 inch (not administered)   lisinopril (ZESTRIL) tablet 20 mg (not administered)   aspirin chewable tablet 324 mg (324 mg Oral Given 11/26/18 0619)   nitroglycerin (NITROSTAT) SL tablet 0 4 mg (0 4 mg Sublingual Given 11/26/18 0621)   insulin regular (HumuLIN R,NovoLIN R) injection 8 Units (8 Units Subcutaneous Given 11/26/18 0800)   nitroglycerin (NITROSTAT) SL tablet 0 4 mg (0 4 mg Sublingual Given 11/26/18 0708)       Diagnostic Studies  Results Reviewed     Procedure Component Value Units Date/Time    Platelet count [[id number]]     Lab Status:  No result Specimen:  Blood     Troponin I [[id number]]     Lab Status:  No result Specimen:  Blood     Lipid panel [[id number]]     Lab Status:  No result Specimen:  Blood     T3, free [[id number]]     Lab Status:  No result Specimen:  Blood     T4, free [[id number]]     Lab Status:  No result Specimen:  Blood     Hemoglobin A1c w/EAG Estimation (Orders if not completed within the last 90 days) [[id number]]     Lab Status:  No result Specimen:  Blood     PTH, intact [[id number]]     Lab Status:  No result Specimen:  Blood     T3 [[id number]]     Lab Status:  No result Specimen:  Blood     T4, free [[id number]]     Lab Status:  No result Specimen:  Blood     APTT [[id number]]  (Abnormal) Collected:  11/26/18 0622    Lab Status:  Final result Specimen:  Blood from Arm, Left Updated:  11/26/18 0714     PTT 25 (L) seconds     Protime-INR [[id number]]  (Normal) Collected:  11/26/18 0622    Lab Status:  Final result Specimen:  Blood from Arm, Left Updated:  11/26/18 0714     Protime 12 8 seconds      INR 0 97    TSH [[id number]]  (Abnormal) Collected:  11/26/18 0622    Lab Status:  Final result Specimen:  Blood from Arm, Left Updated:  11/26/18 0657     TSH 3RD GENERATON 5 330 (H) uIU/mL     Narrative:         Patients undergoing fluorescein dye angiography may retain small amounts of fluorescein in the body for 48-72 hours post procedure  Samples containing fluorescein can produce falsely depressed TSH values  If the patient had this procedure,a specimen should be resubmitted post fluorescein clearance  The recommended reference ranges for TSH during pregnancy are as follows:  First trimester 0 1 to 2 5 uIU/mL  Second trimester  0 2 to 3 0 uIU/mL  Third trimester 0 3 to 3 0 uIU/m      Magnesium [[id number]]  (Normal) Collected:  11/26/18 0622    Lab Status:  Final result Specimen:  Blood from Arm, Left Updated:  11/26/18 0657     Magnesium 1 8 mg/dL     Troponin I [[id number]]  (Normal) Collected:  11/26/18 0622    Lab Status:  Final result Specimen:  Blood from Arm, Left Updated:  11/26/18 0650     Troponin I 0 02 ng/mL     Comprehensive metabolic panel [[id number]]  (Abnormal) Collected:  11/26/18 0622    Lab Status:  Final result Specimen:  Blood from Arm, Left Updated:  11/26/18 6127     Sodium 137 mmol/L      Potassium 3 8 mmol/L      Chloride 98 (L) mmol/L      CO2 32 mmol/L      ANION GAP 7 mmol/L      BUN 18 mg/dL      Creatinine 1 02 mg/dL      Glucose 314 (H) mg/dL      Calcium 10 4 (H) mg/dL      AST 14 U/L      ALT 23 U/L      Alkaline Phosphatase 89 U/L      Total Protein 8 4 (H) g/dL      Albumin 3 4 (L) g/dL      Total Bilirubin 1 00 mg/dL      eGFR 61 ml/min/1 73sq m     Narrative:         National Kidney Disease Education Program recommendations are as follows:  GFR calculation is accurate only with a steady state creatinine  Chronic Kidney disease less than 60 ml/min/1 73 sq  meters  Kidney failure less than 15 ml/min/1 73 sq  meters      CBC and differential [[id number]]  (Abnormal) Collected:  11/26/18 0622    Lab Status:  Final result Specimen:  Blood from Arm, Left Updated:  11/26/18 0629     WBC 6 24 Thousand/uL      RBC 4 20 Million/uL      Hemoglobin 13 6 g/dL      Hematocrit 39 5 %      MCV 94 fL      MCH 32 4 pg      MCHC 34 4 g/dL      RDW 11 8 %      MPV 11 6 fL      Platelets 191 Thousands/uL      nRBC 0 /100 WBCs      Neutrophils Relative 37 (L) %      Immat GRANS % 0 %      Lymphocytes Relative 54 (H) %      Monocytes Relative 6 %      Eosinophils Relative 2 %      Basophils Relative 1 %      Neutrophils Absolute 2 30 Thousands/µL      Immature Grans Absolute 0 01 Thousand/uL      Lymphocytes Absolute 3 40 Thousands/µL      Monocytes Absolute 0 36 Thousand/µL      Eosinophils Absolute 0 13 Thousand/µL      Basophils Absolute 0 04 Thousands/µL     UA w Reflex to Microscopic w Reflex to Culture [[id number]]     Lab Status:  No result Specimen:  Urine                  XR chest 2 views   ED Interpretation by Nathalie Torres PA-C (11/26 0923)   Dual pacemaker, no acute cardiopulmonary disease                 Procedures  ECG 12 Lead Documentation  Date/Time: 11/26/2018 6:53 AM  Performed by: Marika Alcantara  Authorized by: Marika Alcantara     Indications / Diagnosis:  Chest pain  ECG reviewed by me, the ED Provider: yes    Patient location:  ED  Previous ECG:     Previous ECG:  Compared to current    Comparison ECG info:  5/15/18    Similarity:  No change    Comparison to cardiac monitor: Yes    Interpretation:     Interpretation: non-specific    Rate:     ECG rate:  63    ECG rate assessment: normal    Rhythm:     Rhythm: sinus rhythm and paced    Ectopy:     Ectopy: none    QRS:     QRS axis:  Left    QRS intervals: Wide  ST segments:     ST segments:  Normal  T waves:     T waves: normal    Comments:      Possible lateral infarct on a before May 15, 2018           Phone Contacts  ED Phone Contact    ED Course  ED Course as of Nov 26 0939   [facility] Nov 26, 2018   0702 Patient feels better with the nitroglycerin  Will repeat her nitroglycerin  Her repeat pressure is 162/72  Patient is the sugar was addressed with 8 units of regular insulin    Plan admission          HEART Risk Score      Most Recent Value   History  1 Filed at: 11/26/2018 0703   ECG  1 Filed at: 11/26/2018 0703   Age  2 Filed at: 11/26/2018 0703   Risk Factors  2 Filed at: 11/26/2018 0703   Troponin  0 Filed at: 11/26/2018 0703   Heart Score Risk Calculator   History  1 Filed at: 11/26/2018 0703   ECG  1 Filed at: 11/26/2018 0703   Age  2 Filed at: 11/26/2018 0703   Risk Factors  2 Filed at: 11/26/2018 0703   Troponin  0 Filed at: 11/26/2018 0703   HEART Score  6 Filed at: 11/26/2018 0703   HEART Score  6 Filed at: 11/26/2018 0703                            MDM  Number of Diagnoses or Management Options  Chest pain: new and requires workup  Diabetes mellitus ([facility] [address] ): established and worsening  Elevated TSH: new and requires workup  Hypertension: established and worsening     Amount and/or Complexity of Data Reviewed  Clinical lab tests: ordered and reviewed  Tests in the radiology section of CPT®: ordered and reviewed  Tests in the medicine section of CPT®: ordered and reviewed    Risk of Complications, Morbidity, and/or Mortality  Presenting problems: high  Diagnostic procedures: high  Management options: high  General comments: Patient presents emergency room with the an episode of chest pain  She was seen and evaluated  She had a heart score of 6  Her EKG did not demonstrate any ischemic changes  Her initial troponin was within normal range  Her chest x-ray was normal   She was admitted to the [facility] service for 23 hr observation stay to follow her troponins  Her chest pain was relieved with nitroglycerin upon admission    An inch of nitropaste was applied to her chest wall and she did receive 324 mg of aspirin    Patient Progress  Patient progress: stable    CritCare Time    Disposition  Final diagnoses:   Chest pain   Hypertension   Diabetes mellitus ([facility] [address] )   Elevated TSH     Time reflects when diagnosis was documented in both MDM as applicable and the Disposition within this note Time User Action Codes Description Comment    11/26/2018  7:06 AM Jennifer Matar Add [R07 9] Chest pain     11/26/2018  7:14 AM Jennifer Matar Add [I10] Hypertension     11/26/2018  7:14 AM Gwyn Manzo Add [E11 9] [facility]     11/26/2018  9:13 AM Tariq Boles, Gwyn Panchala Add [R79 89] Elevated TSH       ED Disposition     ED Disposition Condition Comment    Admit  Case was discussed with Electa Kehr and the patient's admission status was agreed to be Admission Status: observation status to the service of Dr Electa Kehr   Follow-up Information    None         Patient's Medications   Discharge Prescriptions    No medications on file     No discharge procedures on file      ED Provider  Electronically Signed by           Daniel Queen PA-C  11/26/18 3796

## 2018-11-26 NOTE — ASSESSMENT & PLAN NOTE
Elevated on presentation  Patient did receive nitropaste, blood pressure down to 160 x 80  Continue lisinopril, low-dose of metoprolol

## 2018-11-26 NOTE — ED NOTES
Pt transported to Nuclear Med/ Stress Testing via [address] [facility]  11/26/18 7505

## 2018-11-26 NOTE — CONSULTS
Consultation - Cardiology  Carol Ann Srivastava 66 y o  female MRN: [id number]  Unit/Bed#: ED 23 Encounter: [id number]    Inpatient consult to Cardiology  Consult performed by: Sulma Patino  Consult ordered by: Hiwot Winters          Physician Requesting Consult: No att  providers found  Reason for Consult / Principal Problem:   Chest pain    Chief Complaint   Patient presents with    Chest Pain     onset last night, mid epigastric, burning up into left neck area       HPI: Cardiologist Dr Yudelka Mishra is a 66y o  year old female who has a history of CAD status post stents, status post pacemaker placement, hypertension, diabetes presents with constant centralized burning chest pain that radiates down her left neck  Does complain of some associated nausea and sweating as well  Troponins neg x 3  She states she has had stents and pacemaker placed in the past but cannot recall when, she also admits that she has a family history of heart disease as well  Denies GERD  Denies surgical history of hiatal hernia  Denies any fever or chills  Denies current chest pain, chest pressure, chest heaviness, shortness of breath, headache, syncope, pain or swelling in the extremities      REVIEW OF SYSTEMS:  Constitutional:  + sweating, chills  Eyes:  Denies change in visual acuity   HENT:  Denies nasal congestion or sore throat   Respiratory:  Denies cough or shortness of breath   Cardiovascular:  + chest pain  GI:  + nausea   :  Denies dysuria, frequency, difficulty in micturition and nocturia  Musculoskeletal:  Denies back pain or joint pain   Neurologic:  Denies headache, focal weakness or sensory changes   Endocrine:  Denies polyuria or polydipsia   Lymphatic:  Denies swollen glands   Psychiatric:  Denies depression or anxiety     Historical Information   Past Medical History:   Diagnosis Date    Cardiac disease     Diabetes mellitus ([address] )     Hyperlipidemia     Hypertension      Past Surgical History: Procedure Laterality Date    ANGIOPLASTY      UTERINE FIBROID SURGERY       History   Alcohol Use No     History   Drug Use No     History   Smoking Status    Never Smoker   Smokeless Tobacco    Never Used     Family History: History reviewed  No pertinent family history  MEDS & ALLERGIES:  current meds:   Current Facility-Administered Medications   Medication Dose Route Frequency    acetaminophen (TYLENOL) tablet 650 mg  650 mg Oral Q8H PRN    docusate sodium (COLACE) capsule 100 mg  100 mg Oral BID    heparin (porcine) subcutaneous injection 5,000 Units  5,000 Units Subcutaneous Q8H [address]    insulin glargine (LANTUS) subcutaneous injection 20 Units 0 2 mL  20 Units Subcutaneous HS    insulin lispro (HumaLOG) 100 units/mL subcutaneous injection 1-6 Units  1-6 Units Subcutaneous TID AC    insulin lispro (HumaLOG) 100 units/mL subcutaneous injection 1-6 Units  1-6 Units Subcutaneous HS    insulin lispro (HumaLOG) 100 units/mL subcutaneous injection 10 Units  10 Units Subcutaneous TID With Meals    [START ON 11/27/2018] lisinopril (ZESTRIL) tablet 20 mg  20 mg Oral Daily    metoprolol tartrate (LOPRESSOR) tablet 25 mg  25 mg Oral Q12H [address]    morphine injection 2 mg  2 mg Intravenous Q4H PRN    nitroglycerin (NITROSTAT) SL tablet 0 4 mg  0 4 mg Sublingual Q5 Min PRN    ondansetron (ZOFRAN) injection 4 mg  4 mg Intravenous Q6H PRN    senna (SENOKOT) tablet 8 6 mg  1 tablet Oral Daily    sodium chloride 0 9 % infusion  75 mL/hr Intravenous Continuous       sodium chloride 75 mL/hr     No Known Allergies    OBJECTIVE:  Vitals:   Vitals:    11/26/18 0802   BP: (!) 175/71   Pulse: 61   Resp: 16   Temp:    SpO2: 99%     There is no height or weight on file to calculate BMI      Systolic (10ZAA), RZL:076 , Min:175 , WYO:540     Diastolic (82FJH), WDM:70, Min:71, Max:81    No intake or output data in the 24 hours ending 11/26/18 1031  Weight (last 2 days)     None        Invasive Devices     Peripheral Intravenous Line            Peripheral IV 11/26/18 Right Arm less than 1 day                PHYSICAL EXAMS:  General:  Patient is not in acute distress, laying in the bed comfortably, awake, alert responding to commands  Head: Normocephalic, Atraumatic  HEENT:  Both pupils normal-size atraumatic, normocephalic, nonicteric  Neck:  JVP not raised  Trachea central  Respiratory:  +Decreased breath sounds bilaterally  Cardiovascular:  S1-S2 normal without any murmur rails or rub  GI:  Abdomen soft nontender   Liver and spleen normal size  Musculoskeletal:  + trace lower extremity edema  Integument:  No skin rashes or ulceration  Lymphatic:  No cervical or inguinal lymphadenopathy  Neurologic:  Patient is awake alert, responding to command, well-oriented to time and place and person    LABORATORY RESULTS:    Results from last 7 days  Lab Units 11/26/18  1000 11/26/18  0622   TROPONIN I ng/mL <0 02 0 02     CBC with diff:   Results from last 7 days  Lab Units 11/26/18  1000 11/26/18  0622   WBC Thousand/uL  --  6 24   HEMOGLOBIN g/dL  --  13 6   HEMATOCRIT %  --  39 5   MCV fL  --  94   PLATELETS Thousands/uL 187 182   MCH pg  --  32 4   MCHC g/dL  --  34 4   RDW %  --  11 8   MPV fL 11 1 11 6   NRBC AUTO /100 WBCs  --  0       CMP:  Results from last 7 days  Lab Units 11/26/18  0622   POTASSIUM mmol/L 3 8   CHLORIDE mmol/L 98*   CO2 mmol/L 32   BUN mg/dL 18   CREATININE mg/dL 1 02   CALCIUM mg/dL 10 4*   AST U/L 14   ALT U/L 23   ALK PHOS U/L 89   EGFR ml/min/1 73sq m 61       BMP:  Results from last 7 days  Lab Units 11/26/18  0622   POTASSIUM mmol/L 3 8   CHLORIDE mmol/L 98*   CO2 mmol/L 32   BUN mg/dL 18   CREATININE mg/dL 1 02   CALCIUM mg/dL 10 4*              Results from last 7 days  Lab Units 11/26/18  0622   MAGNESIUM mg/dL 1 8           Results from last 7 days  Lab Units 11/26/18  0622   TSH 3RD GENERATON uIU/mL 5 330*       Results from last 7 days  Lab Units 11/26/18  0622   INR  0 97       Lipid Profile: No results found for: CHOL  Lab Results   Component Value Date    HDL 52 11/26/2018     Lab Results   Component Value Date    LDLCALC 179 (H) 11/26/2018     Lab Results   Component Value Date    TRIG 133 11/26/2018       Cardiac testing:   No results found for this or any previous visit  No results found for this or any previous visit  No procedure found  No results found for this or any previous visit  Imaging: I have personally reviewed pertinent reports  EKG reviewed personally:   Normal sinus rhythm    Telemetry reviewed personally:   Normal sinus rhythm    Assessment/Plan:  Principal Problem:    Chest pain  Active Problems:    Type 2 diabetes mellitus (HCC)    Essential hypertension    Hypercalcemia    Abnormal TSH    1- Chest pain of unknown etiology  Troponins negative x3  Having Atypical chest pain   Echo ordered to assess LV EF valvular and/or wall motion abnormalities  Order chemical stress test to assess for reversible ischemia  Further recommendations will based upon test results  Continue nitroglycerin p r n     2- Hypertension  Markedly elevated on admission 205/81, now 175/71  Increase lisinopril to 20 mg daily  Continue Lopressor 25 b i d  Continue to monitor    3- DM  Continue insulin  Continue to monitor blood glucose  Continue to hydrate  Management person    4- Hypercalcemia  Now 10 4  Continue to monitor  Management per [facility]    5- Abnormal TSH  Now 5 330  Management per [facility]    Code Status: Level 1 - Full Code    Thank you for allowing us to participate in this patient's care  This pt will follow up with Dr Ting Burton once discharged  Counseling / Coordination of Care  Total floor / unit time spent today 35 minutes  Greater than 50% of total time was spent with the patient and / or family counseling and / or coordination of care    A description of the counseling / coordination of care: Review of history, current assessment, development of a plan  Samy Gary PA-C  11/26/2018,10:31 AM    Portions of the record may have been created with voice recognition software   Occasional wrong word or "sound a like" substitutions may have occurred due to the inherent limitations of voice recognition software   Read the chart carefully and recognize, using context, where substitutions have occurred

## 2018-11-26 NOTE — ASSESSMENT & PLAN NOTE
Lab Results   Component Value Date    HGBA1C 10 0 (H) 05/15/2018       No results for input(s): POCGLU in the last 72 hours  Blood Sugar Average: Last 72 hrs:  Obtain hemoglobin A1c  Continue Lantus, pre meal NovoLog and sliding scale insulin  Blood sugars uncontrolled on presentation

## 2018-11-26 NOTE — H&P
HPI -- Horacio Summers 1940, 66 y o  female MRN: [id number]  Unit/Bed#: ED 23 Encounter: [id number]  Primary Care Provider: No primary care provider on file  Date and time admitted to hospital: 11/26/2018  5:54 AM        * Chest pain   Assessment & Plan    Admit observation services maintain telemetry  Cycle troponin x3, TSH, fasting lipid profile, TTE  Consult Cardiology  Will initiate aspirin, low-dose beta-blocker, continue lisinopril  Cardiac diet to ensue  Type 2 diabetes mellitus ([facility] [address] )   Assessment & Plan    Lab Results   Component Value Date    HGBA1C 10 0 (H) 05/15/2018       No results for input(s): POCGLU in the last 72 hours  Blood Sugar Average: Last 72 hrs:  Obtain hemoglobin A1c  Continue Lantus, pre meal NovoLog and sliding scale insulin  Blood sugars uncontrolled on presentation  Essential hypertension   Assessment & Plan    Elevated on presentation  Patient did receive nitropaste, blood pressure down to 160 x 80  Continue lisinopril, low-dose of metoprolol  Hypercalcemia   Assessment & Plan    Obtain PTH  Gentle IV fluids for next 24 hours  Repeat BMP in a m  Abnormal TSH   Assessment & Plan    Obtain free T3, T4  Most likely represent sick euthyroid syndrome  VTE PROPHYLAXIS:  + SCDs    CODE STATUS: FULL    Anticipated Length of Stay:  Patient will be admitted on an Observation basis with an anticipated length of stay of  less than 2 midnights  Justification for Hospital Stay:  Chest pain      CHIEF COMPLAINT   · Chest Pain since 3:00 a m  HISTORY OF PRESENT ILLNESS  Horacio Summers is an elderly 79-year-old lady with past medical history as below came to the ED for complaints of chest pain since 3:00 a m  Patient states that she was at her usual state of health, when she was woken up by sharp central chest discomfort at 3:00 a m  She states that is pressure like and still persistent however intensity has decreased    She states the pain radiated to the left arm and to the back  No aggravating or relieving factors  Associated palpitation and sweating, with mild shortness of breath  She stated that the chest pain lingered on did not go away  She has had such episodes many years ago  Patient lives alone in the apartment, however her son noticed that she was getting progressively weak over the last week hence border to his place last Monday  Patient has difficulty ambulating  Patient denies any cough, fevers or chills, or trauma to the chest   Patient denies PND, orthopnea, nausea, vomiting, diarrhea, constipation, blood in urine, fevers, chills, abdominal pain,dysuria, new onset weakness, slurred speech, seizure-like activity,dizziness, syncope, fall, trauma to the head, recent travel or recent sick contacts  Updated patient's son via the phone  REVIEW OF SYSTEMS  · A comprehensive 10 point review system conducted all negative except as mentioned in HPI       PMH/PSH    Past Medical History:   Diagnosis Date    Cardiac disease     Diabetes mellitus ([facility] [address] )     Hyperlipidemia     Hypertension        Past Surgical History:   Procedure Laterality Date    ANGIOPLASTY      UTERINE FIBROID SURGERY         ALLERGIES  No Known Allergies    HOME MEDICATIONS  No current facility-administered medications on file prior to encounter        Current Outpatient Prescriptions on File Prior to Encounter   Medication Sig    insulin glargine (LANTUS SOLOSTAR) injection pen 100 units/mL Inject 0 2 mL (20 Units total) under the skin daily at bedtime    insulin lispro (HumaLOG) 100 Units/mL SOPN Inject 10 Units under the skin 3 (three) times a day with meals    lisinopril (ZESTRIL) 5 mg tablet Take 5 mg by mouth daily    metFORMIN (GLUCOPHAGE) 500 mg tablet Take 1 tablet (500 mg total) by mouth 2 (two) times a day with meals    NEEDLE, DISP, 15 G 15G X 1-1/2" MISC by Does not apply route 4 (four) times a day    docusate sodium (COLACE) 100 mg capsule Take 1 capsule (100 mg total) by mouth 2 (two) times a day    polyethylene glycol (MIRALAX) 17 g packet Take 17 g by mouth daily         SOCIAL HISTORY     Marital Status:    Substance Use History:   History   Alcohol Use No     History   Smoking Status    Never Smoker   Smokeless Tobacco    Never Used     History   Drug Use No       FAMILY HISTORY  · Reviewed noncontributory    OBJECTIVE    Vitals:   Blood Pressure: (!) 205/81 (11/26/18 0601)  Pulse: 86 (11/26/18 0555)  Temperature: 98 3 °F (36 8 °C) (11/26/18 0555)  Temp Source: Oral (11/26/18 0555)  Respirations: 18 (11/26/18 0555)  SpO2: 96 % (11/26/18 0555)    GENERAL: AAO x 3  HEENT: atraumatic, normocephalic  Oral mucosa moist, no icterus, pallor  PERRLA +  Neck supple, no JVD, no lymphadenopathy, no thryomegaly  CHEST: B/L breath sounds heard  CVS: S1, S2  No cyanosis/clubbing or edema  ABDOMEN: Soft/obese/NT/BS heard  NEUROLOGICAL: CN II -XI grossly intact  No focal motor or sensory deficits  No signs of meningeal irritation or cerebellar dysfunction  EXTREMITIES: No cyanosis/clubbing or edema  LAB DATA   11/26/2018 06:22   eGFR 61   Sodium 137   Potassium 3 8   Chloride 98 (L)   CO2 32   Anion Gap 7   BUN 18   Creatinine 1 02   Glucose, Random 314 (H)   Calcium 10 4 (H)   AST 14   ALT 23   Alkaline Phosphatase 89   Total Protein 8 4 (H)   Albumin 3 4 (L)   TOTAL BILIRUBIN 1 00   Magnesium 1 8   Troponin I 0 02   WBC 6 24   Red Blood Cell Count 4 20   Hemoglobin 13 6   HCT 39 5   MCV 94   MCH 32 4   MCHC 34 4   RDW 11 8   Platelet Count 519   MPV 11 6   nRBC 0   Neutrophils % 37 (L)   Immat GRANS % 0   Lymphocytes Relative 54 (H)   Monocytes Relative 6   Eosinophils 2   Basophils Relative 1   Immature Grans Absolute 0 01   Absolute Neutrophils 2 30   Lymphocytes Absolute 3 40   Absolute Monocytes 0 36   Absolute Eosinophils 0 13   Basophils Absolute 0 04   Protime 12 8   INR 0 97   PTT 25 (L)   TSH 3RD GENERATON 5 330 (H)       No results found      EKG, Pathology, and Other Studies Reviewed on Admission:  Paced rhythm      Total time spent in the process of admission, completion of records, counseling, coordination of care, discussion with patient/family was approximately 35 minutes  Josefa Cox MD  HOSPITALIST SERVICES  11/26/2018      PLEASE NOTE:  This encounter was completed utilizing the [employer] Direct Speech Voice Recognition Software  Grammatical errors, random word insertions, pronoun errors and incomplete sentences are occasional consequences of the system due to software limitations, ambient noise and hardware issues  These may be missed by proof reading prior to affixing electronic signature  Any questions or concerns about the content, text or information contained within the body of this dictation should be directly addressed to the physician for clarification  Please do not hesitate to call me directly if you have any any questions or concerns

## 2018-11-26 NOTE — ED NOTES
Pt returns from Stress Test; [address] bedside     Karen Gale RN  11/26/18 4558

## 2018-11-27 LAB
ANION GAP SERPL CALCULATED.3IONS-SCNC: 5 MMOL/L (ref 4–13)
BUN SERPL-MCNC: 16 MG/DL (ref 5–25)
CALCIUM SERPL-MCNC: 10 MG/DL (ref 8.3–10.1)
CHLORIDE SERPL-SCNC: 103 MMOL/L (ref 100–108)
CO2 SERPL-SCNC: 31 MMOL/L (ref 21–32)
CREAT SERPL-MCNC: 0.84 MG/DL (ref 0.6–1.3)
ERYTHROCYTE [DISTWIDTH] IN BLOOD BY AUTOMATED COUNT: 11.8 % (ref 11.6–15.1)
GFR SERPL CREATININE-BSD FRML MDRD: 77 ML/MIN/1.73SQ M
GLUCOSE P FAST SERPL-MCNC: 151 MG/DL (ref 65–99)
GLUCOSE SERPL-MCNC: 149 MG/DL (ref 65–140)
GLUCOSE SERPL-MCNC: 151 MG/DL (ref 65–140)
GLUCOSE SERPL-MCNC: 175 MG/DL (ref 65–140)
GLUCOSE SERPL-MCNC: 317 MG/DL (ref 65–140)
GLUCOSE SERPL-MCNC: 359 MG/DL (ref 65–140)
HCT VFR BLD AUTO: 38.1 % (ref 34.8–46.1)
HGB BLD-MCNC: 13.1 G/DL (ref 11.5–15.4)
INR PPP: 0.97 (ref 0.86–1.17)
MAGNESIUM SERPL-MCNC: 1.8 MG/DL (ref 1.6–2.6)
MCH RBC QN AUTO: 32.4 PG (ref 26.8–34.3)
MCHC RBC AUTO-ENTMCNC: 34.4 G/DL (ref 31.4–37.4)
MCV RBC AUTO: 94 FL (ref 82–98)
PHOSPHATE SERPL-MCNC: 2.8 MG/DL (ref 2.3–4.1)
PHOSPHATE SERPL-MCNC: 3.5 MG/DL (ref 2.3–4.1)
PLATELET # BLD AUTO: 177 THOUSANDS/UL (ref 149–390)
PMV BLD AUTO: 11.5 FL (ref 8.9–12.7)
POTASSIUM SERPL-SCNC: 3.7 MMOL/L (ref 3.5–5.3)
PROTHROMBIN TIME: 12.8 SECONDS (ref 11.8–14.2)
RBC # BLD AUTO: 4.04 MILLION/UL (ref 3.81–5.12)
SODIUM SERPL-SCNC: 139 MMOL/L (ref 136–145)
WBC # BLD AUTO: 6.29 THOUSAND/UL (ref 4.31–10.16)

## 2018-11-27 PROCEDURE — 85610 PROTHROMBIN TIME: CPT | Performed by: INTERNAL MEDICINE

## 2018-11-27 PROCEDURE — 99233 SBSQ HOSP IP/OBS HIGH 50: CPT | Performed by: GENERAL PRACTICE

## 2018-11-27 PROCEDURE — 85027 COMPLETE CBC AUTOMATED: CPT | Performed by: INTERNAL MEDICINE

## 2018-11-27 PROCEDURE — 80048 BASIC METABOLIC PNL TOTAL CA: CPT | Performed by: INTERNAL MEDICINE

## 2018-11-27 PROCEDURE — G8987 SELF CARE CURRENT STATUS: HCPCS

## 2018-11-27 PROCEDURE — G8988 SELF CARE GOAL STATUS: HCPCS

## 2018-11-27 PROCEDURE — 99232 SBSQ HOSP IP/OBS MODERATE 35: CPT | Performed by: INTERNAL MEDICINE

## 2018-11-27 PROCEDURE — 97167 OT EVAL HIGH COMPLEX 60 MIN: CPT

## 2018-11-27 PROCEDURE — 82948 REAGENT STRIP/BLOOD GLUCOSE: CPT

## 2018-11-27 PROCEDURE — 84100 ASSAY OF PHOSPHORUS: CPT | Performed by: INTERNAL MEDICINE

## 2018-11-27 PROCEDURE — G8979 MOBILITY GOAL STATUS: HCPCS

## 2018-11-27 PROCEDURE — 83735 ASSAY OF MAGNESIUM: CPT | Performed by: INTERNAL MEDICINE

## 2018-11-27 PROCEDURE — 97163 PT EVAL HIGH COMPLEX 45 MIN: CPT

## 2018-11-27 PROCEDURE — G8978 MOBILITY CURRENT STATUS: HCPCS

## 2018-11-27 RX ORDER — ATORVASTATIN CALCIUM 20 MG/1
20 TABLET, FILM COATED ORAL
Status: DISCONTINUED | OUTPATIENT
Start: 2018-11-28 | End: 2018-12-02 | Stop reason: HOSPADM

## 2018-11-27 RX ADMIN — HEPARIN SODIUM 5000 UNITS: 5000 INJECTION, SOLUTION INTRAVENOUS; SUBCUTANEOUS at 15:31

## 2018-11-27 RX ADMIN — METOPROLOL TARTRATE 25 MG: 25 TABLET, FILM COATED ORAL at 23:43

## 2018-11-27 RX ADMIN — LISINOPRIL 20 MG: 20 TABLET ORAL at 08:03

## 2018-11-27 RX ADMIN — DOCUSATE SODIUM 100 MG: 100 CAPSULE, LIQUID FILLED ORAL at 08:00

## 2018-11-27 RX ADMIN — HEPARIN SODIUM 5000 UNITS: 5000 INJECTION, SOLUTION INTRAVENOUS; SUBCUTANEOUS at 05:22

## 2018-11-27 RX ADMIN — METOPROLOL TARTRATE 25 MG: 25 TABLET, FILM COATED ORAL at 08:00

## 2018-11-27 RX ADMIN — INSULIN GLARGINE 20 UNITS: 100 INJECTION, SOLUTION SUBCUTANEOUS at 23:43

## 2018-11-27 RX ADMIN — INSULIN LISPRO 10 UNITS: 100 INJECTION, SOLUTION INTRAVENOUS; SUBCUTANEOUS at 10:59

## 2018-11-27 RX ADMIN — INSULIN LISPRO 5 UNITS: 100 INJECTION, SOLUTION INTRAVENOUS; SUBCUTANEOUS at 17:57

## 2018-11-27 RX ADMIN — DOCUSATE SODIUM 100 MG: 100 CAPSULE, LIQUID FILLED ORAL at 18:00

## 2018-11-27 RX ADMIN — SENNOSIDES 8.6 MG: 8.6 TABLET, FILM COATED ORAL at 08:00

## 2018-11-27 RX ADMIN — INSULIN LISPRO 10 UNITS: 100 INJECTION, SOLUTION INTRAVENOUS; SUBCUTANEOUS at 17:57

## 2018-11-27 RX ADMIN — INSULIN LISPRO 1 UNITS: 100 INJECTION, SOLUTION INTRAVENOUS; SUBCUTANEOUS at 07:58

## 2018-11-27 RX ADMIN — INSULIN LISPRO 10 UNITS: 100 INJECTION, SOLUTION INTRAVENOUS; SUBCUTANEOUS at 12:34

## 2018-11-27 RX ADMIN — HEPARIN SODIUM 5000 UNITS: 5000 INJECTION, SOLUTION INTRAVENOUS; SUBCUTANEOUS at 23:43

## 2018-11-27 RX ADMIN — INSULIN LISPRO 6 UNITS: 100 INJECTION, SOLUTION INTRAVENOUS; SUBCUTANEOUS at 12:33

## 2018-11-27 NOTE — ASSESSMENT & PLAN NOTE
Lab Results   Component Value Date    HGBA1C 10 2 (H) 11/26/2018       Recent Labs      11/26/18   1657  11/26/18   2306  11/27/18   0650  11/27/18   1144   POCGLU  212*  184*  175*  359*       Blood Sugar Average: Last 72 hrs:  (P) 240 4Obtain hemoglobin A1c  Continue Lantus, pre meal NovoLog and sliding scale insulin  Blood sugars uncontrolled on presentation

## 2018-11-27 NOTE — SOCIAL WORK
Cm met with patient at bedside, patient alert and oriented during interview  Patient reports residing home alone in a senior apartment [facility] on the 9th floor  Patient confirmed her daughter Bree Santana  resides local and assists with transporting her to MD appointments and for medications which are filled at [facility]  Patient reports using a rollator to ambulate and ideally needs assistance with ADL's  Patient confirmed having a hx of vna, however, is not able to recall the name of the agency  Patient reports in the past going to [facility] acute rehab in [place], [place] and stated if recommended she would return there  Cm reviewed patient OBS notice and provided a copy for her records  Patient provided cm with consent to speak with her daughter  Cm spoke with patient daughter Brielle Osorio via telephone () at patient bedside  Cm informed patient became ill while visiting her son Carlos in [place]  Patient will return to her county of residence  Cm informed that patient was approved for waiver services that have not started as of yet  Cm informed patient is tentatively scheduled to have services through [facility] M-Sunday 9a-12p and 1p-4p  Daughter mentioned that ideally patient could benefit from SNF and would like her mother to be considered for [facility]  Cm informed patient does not have a POA at this time however, Alondra Hillman has been assisting patient with medical plans as needed  Cm reviewed obs notice with daughter, no reported needs at this time  Referrals sent to [facility] acute rehab for review

## 2018-11-27 NOTE — PLAN OF CARE
CARDIOVASCULAR - ADULT     Absence of cardiac dysrhythmias or at baseline rhythm Progressing        MUSCULOSKELETAL - ADULT     Maintain or return mobility to safest level of function Progressing        PAIN - ADULT     Verbalizes/displays adequate comfort level or baseline comfort level Progressing        Potential for Falls     Patient will remain free of falls Progressing        SAFETY ADULT     Maintain or return to baseline ADL function Progressing     Maintain or return mobility status to optimal level Progressing     Patient will remain free of falls Progressing

## 2018-11-27 NOTE — PLAN OF CARE
Problem: PHYSICAL THERAPY ADULT  Goal: Performs mobility at highest level of function for planned discharge setting  See evaluation for individualized goals  Treatment/Interventions: Functional transfer training, LE strengthening/ROM, Therapeutic exercise, Endurance training, Patient/family training, Equipment eval/education, Bed mobility, Gait training, Continued evaluation, Spoke to nursing, Spoke to case management, OT  Equipment Recommended:  (TBD at [facility])       See flowsheet documentation for full assessment, interventions and recommendations  Prognosis: Fair  Problem List: Decreased strength, Decreased endurance, Impaired balance, Decreased mobility, Decreased cognition, Decreased safety awareness, Pain, Impaired sensation  Assessment: Pt is 66 y o  female seen for PT evaluation on 11/27/2018 s/p admit to [facility] on 11/26/2018 w/ Chest pain  Pt presents with CP, L arm and back radiating pain  PT consulted to assess pt's functional mobility and d/c needs  Order placed for PT eval and tx, w/ up w/ A order  Performed at least 2 patient identifiers during session: Name and wristband  Comorbidities affecting pt's physical performance at time of assessment include: cardiac disease, DM, HLD, HTN  Note pt questionable historian- CM to follow up  PTA, pt was ambulates household distances with rollator and currently residing with son in [place] in [facility]  [facility] with first floor set up per pt report  Pt has own apt in [facility]  Pt has been requiring increased assistance for ADLs per tp report over past 3 months  Personal factors affecting pt at time of IE include: inaccessible home environment, inability to ambulate household distances, inability to navigate level surfaces w/o external assistance, unable to perform dynamic tasks in community, decreased cognition, limited home support, positive fall history, decreased initiation and engagement and limited insight into impairments   Please find objective findings from PT assessment regarding body systems outlined above with impairments and limitations including weakness, impaired balance, decreased endurance, gait deviations, pain, decreased activity tolerance, altered sensation, decreased safety awareness, fall risk and decreased cognition, as well as mobility assessment (need for cueing for mobility technique)  The following objective measures performed on IE also reveal limitations: Barthel Index: 40/100 and Modified [place]: 4 (moderate/severe disability)  Pt's clinical presentation is currently unstable/unpredictable seen in pt's presentation of need for input for task focus and mobility technique, on telemetry monitoring and ongoing medical assessment  Pt to benefit from continued PT tx to address deficits as defined above and maximize level of functional independent mobility and consistency  From PT/mobility standpoint, recommendation at time of d/c would be STR pending progress in order to facilitate return to PLOF  Barriers to Discharge: Decreased caregiver support, Inaccessible home environment     Recommendation: Short-term skilled PT     PT - OK to Discharge: Yes (when medically cleared if to STR)    See flowsheet documentation for full assessment

## 2018-11-27 NOTE — PLAN OF CARE

## 2018-11-27 NOTE — PLAN OF CARE
Problem: OCCUPATIONAL THERAPY ADULT  Goal: Performs self-care activities at highest level of function for planned discharge setting  See evaluation for individualized goals  Treatment Interventions: ADL retraining, Functional transfer training, UE strengthening/ROM, Endurance training, Patient/family training, Equipment evaluation/education, Fine motor coordination activities, Compensatory technique education, Continued evaluation, Energy conservation, Activityengagement          See flowsheet documentation for full assessment, interventions and recommendations  Limitation: Decreased ADL status, Decreased UE strength, Decreased UE ROM, Decreased Safe judgement during ADL, Decreased endurance, Decreased cognition, Decreased self-care trans, Decreased high-level ADLs  Prognosis: Good  Assessment: Patient is a 66 y o  female seen for OT evaluation s/p admit to [address] [place] [address] on 11/26/2018 w/Chest pain  Pt presents with CP, L arm and back radiating pain  Commorbidities affecting patient's functional performance at time of assessment include: cardiac disease, DM, HLD, HTN  Orders placed for OT evaluation and treatment  Performed at least two patient identifiers during session including name and wristband  Prior to admission, Patient reporting indepependent with self care and functional mobility, with recent decline in self performance in the last 3 months  doni tlives alone in [facility], however staying with  her Son and family in [place], [place]  Patient ambulatory with RW  Personal factors affecting patient at time of initial evaluation include: limited caregiver support, steps to enter, limited insight into deficits, decreased initiation and engagement, difficulty performing ADLs and difficulty performing IADLs   Upon evaluation, patient requires moderate assist for UB ADLs, maximal assist for LB ADLs, transfers and functional ambulation in room and bathroom with moderate assist, with the use of Rolling Walker  Occupational performance is affected by the following deficits: orientation, decreased functional use of BUEs, limited active ROM, decreased muscle strength, degenerative arthritic joint changes, impaired gross motor coordination, dynamic sit/ stand balance deficit with poor standing tolerance time for self care and functional mobility, decreased activity tolerance, impaired memory, impaired problem solving, decreased safety awareness, increased pain and postural control and postural alignment deficit, requiring external assistance to complete transitional movements  Therapist completed  extensive additional review of medical records and additional review of physical, cognitive or psychosocial history, clinical examination identifying 5 or more performance deficits, clinical decision making of a high complexity , consistent with a high complexity level evaluation  Patient to benefit from continued Occupational Therapy treatment while in the hospital to address deficits as defined above and maximize level of functional independence with ADLs and functional mobility  Occupational Performance areas to address include: grooming , bathing/ shower, dressing, toilet hygiene, transfer to all surfaces, functional mobility, emergency response, IADLs: safety procedures, Leisure Participation and Social participation        OT Discharge Recommendation: Short Term Rehab

## 2018-11-27 NOTE — PROGRESS NOTES
Progress Note - Joi Orlando 1940, 66 y o  female MRN: [id number]    Unit/Bed#: -01 Encounter: [id number]    Primary Care Provider: No primary care provider on file  Date and time admitted to hospital: 2018  5:54 AM        Abnormal TSH   Assessment & Plan    Obtain free T3, T4 which are normal  Most likely represent sick euthyroid syndrome  Hypercalcemia   Assessment & Plan    pth elevated-enodcrine consult check phos       Type 2 diabetes mellitus [facility])   Assessment & Plan    Lab Results   Component Value Date    HGBA1C 10 2 (H) 2018       Recent Labs      18   1657  18   2306  18   0650  18   1144   POCGLU  212*  184*  175*  359*       Blood Sugar Average: Last 72 hrs:  (P) 240 4Obtain hemoglobin A1c  Continue Lantus, pre meal NovoLog and sliding scale insulin  Blood sugars uncontrolled on presentation  Weakness   Assessment & Plan    PT/OT consults-will likely need STR     * Chest pain   Assessment & Plan    Troponin negative-reproducible to palption left shoulder/trapezius-ETT negative ok to d/c epr cardiology  Consult Cardiology  VTE Pharmacologic Prophylaxis:   Pharmacologic: Heparin  Mechanical VTE Prophylaxis in Place: Yes    Patient Centered Rounds: I have performed bedside rounds with nursing staff today  Discussions with Specialists or Other Care Team Provider:     Education and Discussions with Family / Patient:     Time Spent for Care: 30 minutes  More than 50% of total time spent on counseling and coordination of care as described above  Current Length of Stay: 0 day(s)    Current Patient Status: Observation   Certification Statement: The patient will continue to require additional inpatient hospital stay due to falls    Discharge Plan: PT/OT consults pending    Code Status: Level 1 - Full Code      Subjective:   C/o left shoulder pain       Objective:     Vitals:   Temp (24hrs), Av 1 °F (36 7 °C), Min:97 6 °F (36 4 °C), Max:98 3 °F (36 8 °C)    Temp:  [97 6 °F (36 4 °C)-98 3 °F (36 8 °C)] 98 3 °F (36 8 °C)  HR:  [60-64] 60  Resp:  [18] 18  BP: (127-182)/(63-78) 147/63  SpO2:  [93 %-98 %] 94 %  Body mass index is 30 94 kg/m²  Input and Output Summary (last 24 hours): Intake/Output Summary (Last 24 hours) at 11/27/18 1305  Last data filed at 11/27/18 0800   Gross per 24 hour   Intake              300 ml   Output              550 ml   Net             -250 ml       Physical Exam:     Physical Exam   Constitutional: She appears well-developed and well-nourished  HENT:   Head: Normocephalic and atraumatic  Eyes: Pupils are equal, round, and reactive to light  Cardiovascular: Normal rate and regular rhythm  Pulmonary/Chest: [place] normal and breath sounds normal    Abdominal: Soft  Musculoskeletal: She exhibits no edema  Neurological: She is alert  Additional Data:     Labs:      Results from last 7 days  Lab Units 11/27/18  0455  11/26/18  0622   WBC Thousand/uL 6 29  --  6 24   HEMOGLOBIN g/dL 13 1  --  13 6   HEMATOCRIT % 38 1  --  39 5   PLATELETS Thousands/uL 177  < > 182   NEUTROS PCT %  --   --  37*   LYMPHS PCT %  --   --  54*   MONOS PCT %  --   --  6   EOS PCT %  --   --  2   < > = values in this interval not displayed      Results from last 7 days  Lab Units 11/27/18  0455 11/26/18  0622   SODIUM mmol/L 139 137   POTASSIUM mmol/L 3 7 3 8   CHLORIDE mmol/L 103 98*   CO2 mmol/L 31 32   BUN mg/dL 16 18   CREATININE mg/dL 0 84 1 02   ANION GAP mmol/L 5 7   CALCIUM mg/dL 10 0 10 4*   ALBUMIN g/dL  --  3 4*   TOTAL BILIRUBIN mg/dL  --  1 00   ALK PHOS U/L  --  89   ALT U/L  --  23   AST U/L  --  14   GLUCOSE RANDOM mg/dL 151* 314*       Results from last 7 days  Lab Units 11/27/18  0455   INR  0 97       Results from last 7 days  Lab Units 11/27/18  1144 11/27/18  0650 11/26/18  2306 11/26/18  1657 11/26/18  1039   POC GLUCOSE mg/dl 359* 175* 184* 212* 272*       Results from last 7 days  Lab Units 11/26/18  1000   HEMOGLOBIN A1C % 10 2*               * I Have Reviewed All Lab Data Listed Above  * Additional Pertinent Lab Tests Reviewed: All Labs Within Last 24 Hours Reviewed    Imaging:    Imaging Reports Reviewed Today Include:   Imaging Personally Reviewed by Myself Includes:      Recent Cultures (last 7 days):           Last 24 Hours Medication List:     Current Facility-Administered Medications:  acetaminophen 650 mg Oral Q8H PRN Ynes Velasquez MD    docusate sodium 100 mg Oral BID Edmar Paul MD    heparin (porcine) 5,000 Units Subcutaneous Q8H [address] Ynes Velasquez MD    insulin glargine 20 Units Subcutaneous HS Edmar Paul MD    insulin lispro 1-6 Units Subcutaneous TID Sara Liz MD    insulin lispro 1-6 Units Subcutaneous HS Ynes Velasquez MD    insulin lispro 10 Units Subcutaneous TID With Meals Edmar Paul MD    lisinopril 20 mg Oral Daily Clif Nugent PA-C    metoprolol tartrate 25 mg Oral Q12H [address] Ynes Velasquez MD    morphine injection 2 mg Intravenous Q4H PRN Ynes Velasquez MD    nitroglycerin 0 4 mg Sublingual Q5 Min PRN Ynes Hong MD    ondansetron 4 mg Intravenous Q6H PRN Edmar Paul MD    senna 1 tablet Oral Daily Ynes Velasquez MD    sodium chloride 75 mL/hr Intravenous Continuous Edmar Paul MD Last Rate: 75 mL/hr (11/26/18 1786)        Today, Patient Was Seen By: Unique Underwood MD    ** Please Note: Dictation voice to text software may have been used in the creation of this document   **

## 2018-11-27 NOTE — SOCIAL WORK
Phone call received from [employer] liaison reporting she needs OT notes to make final determination, vs a verbal from rn    OT notes to be provided when available

## 2018-11-27 NOTE — UTILIZATION REVIEW
Initial Clinical Review    Admission: Date/Time/Statement: OBS   11/26   0714    Orders Placed This Encounter   Procedures    Place in Observation (expected length of stay for this patient is less than two midnights)     Standing Status:   Standing     Number of Occurrences:   1     Order Specific Question:   Admitting Physician     Answer:   Jorge A Marshall [[id number]]     Order Specific Question:   Level of Care     Answer:   Med Surg [16]         ED: Date/Time/Mode of Arrival:   ED Arrival Information     Expected Arrival Acuity Means of Arrival Escorted By Service Admission Type    - 11/26/2018 05:54 Urgent Ambulance Other General Medicine Urgent    Arrival Complaint    Chest Pain          Chief Complaint:   Chief Complaint   Patient presents with    Chest Pain     onset last night, mid epigastric, burning up into left neck area       History of Illness: Andi Torres is an elderly 80-year-old lady with past medical history as below came to the ED for complaints of chest pain since 3:00 a m  Patient states that she was at her usual state of health, when she was woken up by sharp central chest discomfort at 3:00 a m  She states that is pressure like and still persistent however intensity has decreased  She states the pain radiated to the left arm and to the back  No aggravating or relieving factors  Associated palpitation and sweating, with mild shortness of breath  She stated that the chest pain lingered on did not go away        ED Vital Signs:   ED Triage Vitals   Temperature Pulse Respirations Blood Pressure SpO2   11/26/18 0555 11/26/18 0555 11/26/18 0555 11/26/18 0601 11/26/18 0555   98 3 °F (36 8 °C) 86 18 (!) 205/81 96 %      Temp Source Heart Rate Source Patient Position - Orthostatic VS BP Location FiO2 (%)   11/26/18 0555 11/26/18 0555 11/26/18 0802 11/26/18 0802 --   Oral Monitor Lying Right arm       Pain Score       11/26/18 0557       8        Wt Readings from Last 1 Encounters:   11/27/18 92 3 kg (203 lb 7 8 oz)       Vital Signs (abnormal):   11/26/18 2249  98 2 °F (36 8 °C)  64  18   182/77  93 %  None (Room air)  Lying   11/26/18 1652  --  60  --   178/77  98 %  None (Room air)  Lying   11/26/18 1100  98 5 °F (36 9 °C)  60  16   172/77  97 %  None (Room air)  Lying   11/26/18 1053  --  63  --  158/70  --  --  --   11/26/18 0802  --  61  16   175/71  99 %  None (Room air)  Lying         Abnormal Labs/Diagnostic Test Results: tsh  5 330, cl 98, gluc 314, marco 10 4, total prot 8 4, alb   3 4  Stress test -  Stress results: There was no chest pain during stress  -  ECG conclusions: The stress ECG was non-diagnostic secondary to paced rhythm  -  Perfusion imaging: There were no perfusion defects   -  Gated SPECT: The calculated left ventricular ejection fraction was 52 %  There was no left ventricular regional abnormality    CXR - wnl   EKG- NSR     ED Treatment:   Medication Administration from 11/26/2018 0554 to 11/26/2018 2248       Date/Time Order Dose Route Action Action by Comments     11/26/2018 0619 aspirin chewable tablet 324 mg 324 mg Oral Given Jorge Maher RN      11/26/2018 1946 nitroglycerin (NITROSTAT) SL tablet 0 4 mg 0 4 mg Sublingual Given Jorge Maher RN      11/26/2018 0800 insulin regular (HumuLIN R,NovoLIN R) injection 8 Units 8 Units Subcutaneous Given Brand Cockayne, RN      11/26/2018 0708 nitroglycerin (NITROSTAT) SL tablet 0 4 mg 0 4 mg Sublingual Given Jorge Maher RN      11/26/2018 1902 docusate sodium (COLACE) capsule 100 mg 100 mg Oral Not Given Sean Hinojosa RN Duplicate order     08/31/1316 1715 docusate sodium (COLACE) capsule 100 mg 100 mg Oral Given Brand Cockayne, RN      11/26/2018 1903 insulin lispro (HumaLOG) 100 units/mL subcutaneous injection 10 Units 10 Units Subcutaneous Not Given Sean Hinojosa RN last given at 17:03     11/26/2018 1703 insulin lispro (HumaLOG) 100 units/mL subcutaneous injection 10 Units 10 Units Subcutaneous Given Marie MILLIGAN Kala Nath RN      11/26/2018 1455 lisinopril (ZESTRIL) tablet 10 mg   Oral Canceled Entry David Alan RN      11/26/2018 1715 senna (SENOKOT) tablet 8 6 mg 8 6 mg Oral Given David Alan RN      11/26/2018 1509 heparin (porcine) subcutaneous injection 5,000 Units 5,000 Units Subcutaneous Given David Alan RN      11/26/2018 1715 acetaminophen (TYLENOL) tablet 650 mg 650 mg Oral Given David Alan RN      11/26/2018 1400 morphine injection 2 mg 2 mg Intravenous Not Given David Alan RN      11/26/2018 1702 insulin lispro (HumaLOG) 100 units/mL subcutaneous injection 1-6 Units 2 Units Subcutaneous Given David Alan RN      11/26/2018 1053 insulin lispro (HumaLOG) 100 units/mL subcutaneous injection 1-6 Units 4 Units Subcutaneous Given David Alan RN      11/26/2018 2211 metoprolol tartrate (LOPRESSOR) tablet 25 mg 25 mg Oral Given Haresh Dior RN      11/26/2018 1053 metoprolol tartrate (LOPRESSOR) tablet 25 mg 25 mg Oral Given David Alan RN      11/26/2018 0939 nitroglycerin (NITRO-BID) 2 % TD ointment 1 inch 1 inch Topical Given David Alan RN      11/26/2018 1308 regadenoson (LEXISCAN) injection 0 4 mg 0 4 mg Intravenous Given Kathryn Nova RN           Past Medical/Surgical History: Active Ambulatory Problems     Diagnosis Date Noted    Weakness 05/15/2018    Type 2 diabetes mellitus ([facility] ) 05/15/2018    Essential hypertension 05/15/2018     Past Medical History:   Diagnosis Date    Cardiac disease     [facility]     Hyperlipidemia     Hypertension        Admitting Diagnosis: Diabetes mellitus ([facility] ) [E11 9]  Chest pain [R07 9]  Hypertension [I10]  Elevated TSH [R79 89]    Age/Sex: 66 y o  female    Assessment/Plan:   Chest pain   Assessment & Plan     Admit observation services maintain telemetry  Cycle troponin x3, TSH, fasting lipid profile, TTE  Consult Cardiology    Will initiate aspirin, low-dose beta-blocker, continue lisinopril  Cardiac diet to ensue     Type 2 diabetes mellitus [facility])   Assessment & Plan             Lab Results   Component Value Date     HGBA1C 10 0 (H) 05/15/2018    No results for input(s): POCGLU in the last 72 hours    Blood Sugar Average: Last 72 hrs:  Obtain hemoglobin A1c  Continue Lantus, pre meal NovoLog and sliding scale insulin  Blood sugars uncontrolled on presentation     Essential hypertension   Assessment & Plan     Elevated on presentation  Patient did receive nitropaste, blood pressure down to 160 x 80  Continue lisinopril, low-dose of metoprolol       Hypercalcemia   Assessment & Plan     Obtain PTH  Gentle IV fluids for next 24 hours  Repeat BMP in a m     Abnormal TSH   Assessment & Plan     Obtain free T3, T4  Most likely represent sick euthyroid syndrome        VTE PROPHYLAXIS:  + SCDs   CODE STATUS: Tamiko Gabriela  Anticipated Length of Stay:  Patient will be admitted on an Observation basis with an anticipated length of stay of  less than 2 midnights     Justification for Hospital Stay:  Chest pain       Admission Orders:  Scheduled Meds:   Current Facility-Administered Medications:  acetaminophen 650 mg Oral Q8H PRN     docusate sodium 100 mg Oral BID     heparin (porcine) 5,000 Units Subcutaneous Q8H [facility]     insulin glargine 20 Units Subcutaneous HS     insulin lispro 1-6 Units Subcutaneous TID AC     insulin lispro 1-6 Units Subcutaneous HS     insulin lispro 10 Units Subcutaneous TID With Meals     lisinopril 20 mg Oral Daily     metoprolol tartrate 25 mg Oral Q12H [facility]     morphine injection 2 mg Intravenous Q4H PRN     nitroglycerin 0 4 mg Sublingual Q5 Min PRN     ondansetron 4 mg Intravenous Q6H PRN     senna 1 tablet Oral Daily     sodium chloride 75 mL/hr Intravenous Continuous  Last Rate: 75 mL/hr (11/26/18 6216)     Fingerstick ac and hs   Cons carb diet   Cardiology consult   SCD  OT PT eval   Daily weight   I&O   Tele   Serial trop - all wnl   Echo - EF 65 %     Cardiology consult  11/26  1- Chest pain of unknown etiology  Troponins negative x3  Having Atypical chest pain   Echo ordered to assess LV EF valvular and/or wall motion abnormalities  Order chemical stress test to assess for reversible ischemia  Further recommendations will based upon test results  Continue nitroglycerin p r n    2- Hypertension  Markedly elevated on admission 205/81, now 175/71  Increase lisinopril to 20 mg daily  Continue Lopressor 25 b i d    Continue to monitor   3- DM  Continue insulin  Continue to monitor blood glucose  Continue to hydrate  Management person   4- Hypercalcemia  Now 10 4  Continue to monitor  Management per [facility]   5- Abnormal TSH  Now 5 330  Management per [facility]

## 2018-11-27 NOTE — ASSESSMENT & PLAN NOTE
Troponin negative-reproducible to palption left shoulder/trapezius-ETT negative ok to d/c epr cardiology

## 2018-11-27 NOTE — PHYSICAL THERAPY NOTE
Physical Therapy Evaluation     Patient's Name: Marquita Zhu    Admitting Diagnosis  Diabetes mellitus ([facility] ) [E11 9]  Chest pain [R07 9]  Hypertension [I10]  Elevated TSH [R79 89]    Problem List  Patient Active Problem List   Diagnosis    Weakness    Type 2 diabetes mellitus ([facility] )    Essential hypertension    Chest pain    Hypercalcemia    Abnormal TSH       Past Medical History  Past Medical History:   Diagnosis Date    Cardiac disease     [facility] )     Hyperlipidemia     Hypertension        Past Surgical History  Past Surgical History:   Procedure Laterality Date    ANGIOPLASTY      UTERINE FIBROID SURGERY          11/27/18 1146   Note Type   Note type Eval only   Pain Assessment   Pain Assessment 0-10   Pain Score 8  (RN notified of such)   Pain Type Chronic pain  ("I've had it for awhile")   Pain Location Shoulder   Pain Orientation Left   Pain Descriptors Burning   Pain Frequency Constant/continuous   Pain Onset Gradual   Pain Rating: FLACC (Rest) - Face 0   Pain Rating: FLACC (Rest) - Legs 1   Pain Rating: FLACC (Rest) - Activity 0   Pain Rating: FLACC (Rest) - Cry 1   Pain Rating: FLACC (Rest) - Consolability 1   Score: FLACC (Rest) 3   Home Living   Type of Home House  (pt's apt set up is in [facility])   Home Layout Two level;Performs ADLs on one level; Able to live on main level with bedroom/bathroom;Stairs to enter with rails  (1st floor set up at son's house  0 DEJAN)   Bathroom Shower/Tub Tub/shower unit  (pt reports she only sponge bathes)   Bathroom Toilet Standard   Bathroom Equipment (none at son's per pt report)   [address]  (rollator used in/out of home per pt report)   Additional Comments Pt reports she has been staying at [facility] in [place]  Plan when d/c is to return to son's  Set up above is son's set up  Pt reports she has railing and chair in her bathroom set up at her apt in [facility]   Pt reports she recently needing increased assistance for ADLs from family 2/2 "I'm sick, I just can't do what I used to do before"  Pt reports it has been ~3 months decline  Prior Function   Level of [place] Needs assistance with ADLs and functional mobility; Needs assistance with IADLs  (ambulates c rollator)   Lives With Son;Family  (son works during the day  DIL also works  pt notes (-) alone)   Receives Help From Family   ADL Assistance Needs assistance  (family A c showers)   IADLs Needs assistance  (family assists with transportation, meds, meals)   Falls in the last 6 months 1 to 4   Comments Pt questionable historian- CM to follow up  Pt reports she wears depends at baseline  Pt reports she is ambulatory with rollator  Warms prepped food up during the day that family prepares  Restrictions/Precautions   Weight Bearing Precautions Per Order No   Braces or Orthoses (none per pt)   Other Precautions Chair Alarm;Telemetry; Fall Risk;Cognitive;Pain   General   Family/Caregiver Present No   Cognition   Overall Cognitive Status Impaired   Arousal/Participation Alert   Orientation Level Oriented to person;Oriented to situation  (inconsistent to place- [facility] hospital)   Memory Decreased short term memory;Decreased recall of precautions;Decreased recall of recent events   Following Commands Follows one step commands with increased time or repetition   Comments pt agreeable to PT evaluation   inconsistent to time- thought month December   RUE Assessment   RUE Assessment (defer to OT eval for comments)   LUE Assessment   LUE Assessment X  (defer to OT eval for comments)   RLE Assessment   RLE Assessment X  (grossly 3+/5)   LLE Assessment   LLE Assessment X  (grossly 3+/5)   Coordination   Movements are Fluid and Coordinated 1   Sensation X  (pt reports chronic "numbness/tingling" to B feet, plantar surface)   Light Touch   RLE Light Touch Impaired   LLE Light Touch Impaired   Bed Mobility   Additional Comments pt received OOB in recliner upon arrival   Transfers   Sit to Stand 4  Minimal assistance   Additional items Assist x 2; Increased time required;Verbal cues   Stand to Sit 4  Minimal assistance   Additional items Assist x 2; Increased time required;Verbal cues   Additional Comments pt able to demonstrate components of egress test   Ambulation/Elevation   Gait pattern Decreased foot clearance; Wide GIFTY; Forward Flexion; Short stride; Step to;Excessively slow; Shuffling   Gait Assistance 4  Minimal assist   Additional items Assist x 2;Verbal cues; Tactile cues   Assistive Device Rolling walker   Distance 2' with close chair follow   Stair Management Assistance Not tested   Balance   Static Sitting Fair -   Dynamic Sitting Poor +   Static Standing Poor   Dynamic Standing Poor   Ambulatory Poor   Endurance Deficit   Endurance Deficit Yes   Activity Tolerance   Activity Tolerance Patient limited by fatigue   Medical Staff Made Aware OT Ramirez Kirkland   Nurse Made Aware RN Hanna Devries verbalized pt appropriate to see, made aware of session outcome/recs   Assessment   Prognosis Fair   Problem List Decreased strength;Decreased endurance; Impaired balance;Decreased mobility; Decreased cognition;Decreased safety awareness;Pain; Impaired sensation   Assessment Pt is 66 y o  female seen for PT evaluation on 11/27/2018 s/p admit to [facility] on 11/26/2018 w/ Chest pain  Pt presents with CP, L arm and back radiating pain  PT consulted to assess pt's functional mobility and d/c needs  Order placed for PT eval and tx, w/ up w/ A order  Performed at least 2 patient identifiers during session: Name and wristband  Comorbidities affecting pt's physical performance at time of assessment include: cardiac disease, DM, HLD, HTN  Note pt questionable historian- CM to follow up  PTA, pt was ambulates household distances with rollator and currently residing with son in [place] in [facility] with first floor set up per pt report  Pt has own apt in [place]   Pt has been requiring increased assistance for ADLs per tp report over past 3 months  Personal factors affecting pt at time of IE include: inaccessible home environment, inability to ambulate household distances, inability to navigate level surfaces w/o external assistance, unable to perform dynamic tasks in community, decreased cognition, limited home support, positive fall history, decreased initiation and engagement and limited insight into impairments  Please find objective findings from PT assessment regarding body systems outlined above with impairments and limitations including weakness, impaired balance, decreased endurance, gait deviations, pain, decreased activity tolerance, altered sensation, decreased safety awareness, fall risk and decreased cognition, as well as mobility assessment (need for cueing for mobility technique)  The following objective measures performed on IE also reveal limitations: Barthel Index: 40/100 and Modified Aaron: 4 (moderate/severe disability)  Pt's clinical presentation is currently unstable/unpredictable seen in pt's presentation of need for input for task focus and mobility technique, on telemetry monitoring and ongoing medical assessment  Pt to benefit from continued PT tx to address deficits as defined above and maximize level of functional independent mobility and consistency  From PT/mobility standpoint, recommendation at time of d/c would be STR pending progress in order to facilitate return to PLOF  Barriers to Discharge Decreased caregiver support; Inaccessible home environment   Goals   Patient Goals to return home   STG Expiration Date 12/07/18   Short Term Goal #1 In 7-10 days: Increase bilateral LE strength 1/2 grade to facilitate independent mobility, Perform all bed mobility tasks with SBA to decrease caregiver burden, Perform all transfers with SBA to improve independence, Ambulate > 50 ft  with least restrictive assistive device with SBA w/o LOB and w/ normalized gait pattern 100% of the time, Increase all balance 1/2 grade to decrease risk for falls, Improve Barthel Index score to 55 or greater to facilitate independence and PT provider will perform functional balance assessment to determine fall risk   Treatment Day 0   Plan   Treatment/Interventions Functional transfer training;LE strengthening/ROM; Therapeutic exercise; Endurance training;Patient/family training;Equipment eval/education; Bed mobility;Gait training;Continued evaluation;Spoke to nursing;Spoke to case management;OT   PT Frequency (3-5x/wk)   Recommendation   Recommendation Short-term skilled PT   Equipment Recommended (TBD at STR)   PT - OK to Discharge Yes  (when medically cleared if to STR)   Modified [place] Scale   Modified [place] Scale 4   Barthel Index   Feeding 10   Bathing 0   Grooming Score 0   Dressing Score 5   Bladder Score 5   Bowels Score 5   Toilet Use Score 5   Transfers (Bed/Chair) Score 10   Mobility (Level Surface) Score 0   Stairs Score 0   Barthel Index Score 40         Wilber Sargent, PT, DPT

## 2018-11-27 NOTE — OCCUPATIONAL THERAPY NOTE
Occupational Therapy Evaluation        Patient Name: Beena Martinez  Today's Date: 11/27/2018 11/27/18 1120   Note Type   Note type Eval only   Restrictions/Precautions   Weight Bearing Precautions Per Order No   Braces or Orthoses (none per pt)   Other Precautions Chair Alarm;Telemetry; Fall Risk   Pain Assessment   Pain Assessment 0-10   Pain Score 8  (RN notified of such)   Pain Type Chronic pain  ("I've had it for awhile")   Pain Location Shoulder   Pain Orientation Left   Pain Descriptors Burning   Pain Frequency Constant/continuous   Pain Onset Gradual   Pain Rating: FLACC (Rest) - Face 0   Pain Rating: FLACC (Rest) - Legs 1   Pain Rating: FLACC (Rest) - Activity 0   Pain Rating: FLACC (Rest) - Cry 1   Pain Rating: FLACC (Rest) - Consolability 1   Score: FLACC (Rest) 3   Home Living   Type of Home House  (pt's home set up is in [facility])   Home Layout Two level;Performs ADLs on one level; Able to live on main level with bedroom/bathroom;Stairs to enter with rails  (1st floor set up  0 DEJAN)   Bathroom Shower/Tub Tub/shower unit  (pt reports she only sponge bathes)   Bathroom Toilet Standard   Bathroom Equipment (none at son's per pt report)   [address] [facility]  (rollator used in/out of home per pt report)   Additional Comments Pt reports she has been staying at [facility] in [place]  Plan when d/c is to return to son's  Set up above is son's set up  Pt reports she has railing and chair in her bathroom set up at her apt in [facility]  Pt reports she recently needing increased assistance for ADLs from family 2/2 "I'm sick, I just can't do what I used to do before"  Pt reports it has been ~3 months decline  Prior Function   Level of [place] Needs assistance with ADLs and functional mobility; Needs assistance with IADLs  (ambulates c rollator)   Lives With Son;Family  (son works during the day  DIL also works   pt notes (-) alone)   Receives Help From Family   ADL Assistance Needs assistance  (family A c showers)   IADLs Needs assistance  (family assists with transportation, meds, meals)   Falls in the last 6 months 1 to 4   Comments Pt questionable historian- CM to follow up  Pt reports she wears depends at baseline  Pt reports she is ambulatory with rollator  Warms prepped food up during the day that family prepares  Lifestyle   Autonomy Patient reporting indepependent with self care and functional mobility, with recent decline in self performance in the last 3 months  patien tlives alone in [facility], however staying with  her Son and family in [place], [place]  Patient ambulatory with RW  Reciprocal Relationships Supportive family   Psychosocial   Psychosocial (WDL) WDL   ADL   Eating Assistance 6  Modified independent   Grooming Assistance 5  Supervision/Setup   UB Bathing Assistance 3  Moderate Assistance   LB Bathing Assistance 2  Maximal Assistance   UB Dressing Assistance 3  Moderate Assistance   LB Dressing Assistance 2  Maximal Assistance   Toileting Assistance  2  Maximal Assistance   Functional Assistance 3  Moderate Assistance   Bed Mobility   Additional Comments pt received OOB in recliner upon arrival   Transfers   Sit to Stand 4  Minimal assistance   Additional items Assist x 2; Increased time required;Verbal cues   Stand to Sit 4  Minimal assistance   Additional items Assist x 2; Increased time required;Verbal cues;Armrests   Functional Mobility   Functional Mobility 3  Moderate assistance   Additional items Rolling walker  (low endurance/ poor staic standing tolerance)   Balance   Static Sitting Fair -   Dynamic Sitting Poor +   Static Standing Poor   Dynamic Standing Poor   Activity Tolerance   Activity Tolerance Patient limited by fatigue   Nurse Made Aware TIFFANY Gonzales verbalized pt appropriate to see, made aware of session outcome/recs   RUE Assessment   RUE Assessment X  (limited overhead movements)   RUE Overall AROM   R Shoulder Flexion ~90 degrees of shoulder flexion with c/o of stiffness at end range   RUE Strength   RUE Overall Strength Deficits  (2+/5)   LUE Assessment   LUE Assessment X  (limited overhead movements)   LUE Overall AROM   L Shoulder Flexion ~90 degrees of shoulder flexion with c/o of stiffness at end range   LUE Strength   LUE Overall Strength Deficits  (2+/5)   Hand Function   Gross Motor Coordination Impaired   Fine Motor Coordination Functional   Sensation   Light Touch No apparent deficits  (BUEs)   Vision-Basic Assessment   Current Vision Wears glasses only for reading   Patient Visual Report Other (Comment)  (No significant changes reported)   Perception   Inattention/Neglect Appears intact   Cognition   Overall Cognitive Status Impaired   Arousal/Participation Alert; Responsive; Cooperative   Attention Attends with cues to redirect   Orientation Level Oriented to person;Oriented to situation   Memory Decreased short term memory;Decreased recall of recent events   Following Commands Follows one step commands with increased time or repetition   Assessment   Limitation Decreased ADL status; Decreased UE strength;Decreased UE ROM; Decreased Safe judgement during ADL;Decreased endurance;Decreased cognition;Decreased self-care trans;Decreased high-level ADLs   Prognosis Good   Assessment Patient is a 66 y o  female seen for OT evaluation s/p admit to [address] [place] [address] on 11/26/2018 w/Chest pain  Pt presents with CP, L arm and back radiating pain  Commorbidities affecting patient's functional performance at time of assessment include: cardiac disease, DM, HLD, HTN  Orders placed for OT evaluation and treatment  Performed at least two patient identifiers during session including name and wristband  Prior to admission, Patient reporting indepependent with self care and functional mobility, with recent decline in self performance in the last 3 months   patien tlives alone in [facility], however staying with  her Son and family in [place], [place]  Patient ambulatory with RW  Personal factors affecting patient at time of initial evaluation include: limited caregiver support, steps to enter, limited insight into deficits, decreased initiation and engagement, difficulty performing ADLs and difficulty performing IADLs  Upon evaluation, patient requires moderate assist for UB ADLs, maximal assist for LB ADLs, transfers and functional ambulation in room and bathroom with moderate assist, with the use of Rolling Walker  Occupational performance is affected by the following deficits: orientation, decreased functional use of BUEs, limited active ROM, decreased muscle strength, degenerative arthritic joint changes, impaired gross motor coordination, dynamic sit/ stand balance deficit with poor standing tolerance time for self care and functional mobility, decreased activity tolerance, impaired memory, impaired problem solving, decreased safety awareness, increased pain and postural control and postural alignment deficit, requiring external assistance to complete transitional movements  Therapist completed  extensive additional review of medical records and additional review of physical, cognitive or psychosocial history, clinical examination identifying 5 or more performance deficits, clinical decision making of a high complexity , consistent with a high complexity level evaluation  Patient to benefit from continued Occupational Therapy treatment while in the hospital to address deficits as defined above and maximize level of functional independence with ADLs and functional mobility  Occupational Performance areas to address include: grooming , bathing/ shower, dressing, toilet hygiene, transfer to all surfaces, functional mobility, emergency response, IADLs: safety procedures, Leisure Participation and Social participation  Plan   Treatment Interventions ADL retraining;Functional transfer training;UE strengthening/ROM; Endurance training;Patient/family training;Equipment evaluation/education; Fine motor coordination activities; Compensatory technique education;Continued evaluation; Energy conservation; Activityengagement   Goal Expiration Date 12/11/18   OT Frequency 3-5x/wk   Recommendation   OT Discharge Recommendation Short Term Rehab   Barthel Index   Feeding 10   Bathing 0   Grooming Score 0   Dressing Score 5   Bladder Score 5   Bowels Score 5   Toilet Use Score 5   Transfers (Bed/Chair) Score 10   Mobility (Level Surface) Score 0   Stairs Score 0   Barthel Index Score 40   Modified Aaron Scale   Modified [place] Scale 4        From OT standpoint, recommendation at time of d/c would be Short Term Rehab  Occupational Therapy goals: In 7-14 days:     1- Patient will verbalize and demonstrate use of energy conservation/ deep breathing technique and work simplification skills during functional activity with no verbal cues  2-Patient will verbalize and demonstrate good body mechanics and joint protection techniques during  ADLs/ IADLs with no verbal cues  3- Patient will increase OOB/ sitting tolerance to 2-4 hours per day for increased participation in self care and leisure tasks with no s/s of exertion  4-Patient will increase standing tolerance time to 5  minutes with unilateral UE support to complete sink level ADLs@ mod I level   5- Patient will increase sitting tolerance at edge of bed to 20 minutes to complete UB ADLs @ set up assist level  6- Patient will transfer bed to Chair / toilet at Set up assist level with AD as indicated  7- Patient will complete UB ADLs with set up assist  8- Patient will complete LB ADLs with min assist with the use of adaptive equipment  9- Patient will complete toileting hygiene with set up assist/ supervision for thoroughness    10-Patient/ Family  will demonstrate competency with UE Home Exercise Program

## 2018-11-28 ENCOUNTER — APPOINTMENT (INPATIENT)
Dept: RADIOLOGY | Facility: HOSPITAL | Age: 78
DRG: 644 | End: 2018-11-28
Payer: MEDICARE

## 2018-11-28 LAB
ANION GAP SERPL CALCULATED.3IONS-SCNC: 7 MMOL/L (ref 4–13)
BUN SERPL-MCNC: 17 MG/DL (ref 5–25)
CALCIUM SERPL-MCNC: 10 MG/DL (ref 8.3–10.1)
CHLORIDE SERPL-SCNC: 103 MMOL/L (ref 100–108)
CO2 SERPL-SCNC: 30 MMOL/L (ref 21–32)
CREAT SERPL-MCNC: 0.84 MG/DL (ref 0.6–1.3)
GFR SERPL CREATININE-BSD FRML MDRD: 77 ML/MIN/1.73SQ M
GLUCOSE SERPL-MCNC: 130 MG/DL (ref 65–140)
GLUCOSE SERPL-MCNC: 220 MG/DL (ref 65–140)
GLUCOSE SERPL-MCNC: 233 MG/DL (ref 65–140)
GLUCOSE SERPL-MCNC: 248 MG/DL (ref 65–140)
GLUCOSE SERPL-MCNC: 98 MG/DL (ref 65–140)
POTASSIUM SERPL-SCNC: 3.9 MMOL/L (ref 3.5–5.3)
SODIUM SERPL-SCNC: 140 MMOL/L (ref 136–145)

## 2018-11-28 PROCEDURE — G0427 INPT/ED TELECONSULT70: HCPCS | Performed by: INTERNAL MEDICINE

## 2018-11-28 PROCEDURE — 82948 REAGENT STRIP/BLOOD GLUCOSE: CPT

## 2018-11-28 PROCEDURE — 99232 SBSQ HOSP IP/OBS MODERATE 35: CPT | Performed by: GENERAL PRACTICE

## 2018-11-28 PROCEDURE — 80048 BASIC METABOLIC PNL TOTAL CA: CPT | Performed by: GENERAL PRACTICE

## 2018-11-28 PROCEDURE — 72072 X-RAY EXAM THORAC SPINE 3VWS: CPT

## 2018-11-28 PROCEDURE — 73030 X-RAY EXAM OF SHOULDER: CPT

## 2018-11-28 RX ADMIN — INSULIN LISPRO 3 UNITS: 100 INJECTION, SOLUTION INTRAVENOUS; SUBCUTANEOUS at 16:58

## 2018-11-28 RX ADMIN — SENNOSIDES 8.6 MG: 8.6 TABLET, FILM COATED ORAL at 09:12

## 2018-11-28 RX ADMIN — HEPARIN SODIUM 5000 UNITS: 5000 INJECTION, SOLUTION INTRAVENOUS; SUBCUTANEOUS at 16:58

## 2018-11-28 RX ADMIN — DOCUSATE SODIUM 100 MG: 100 CAPSULE, LIQUID FILLED ORAL at 09:12

## 2018-11-28 RX ADMIN — INSULIN LISPRO 10 UNITS: 100 INJECTION, SOLUTION INTRAVENOUS; SUBCUTANEOUS at 09:13

## 2018-11-28 RX ADMIN — METOPROLOL TARTRATE 25 MG: 25 TABLET, FILM COATED ORAL at 09:12

## 2018-11-28 RX ADMIN — HEPARIN SODIUM 5000 UNITS: 5000 INJECTION, SOLUTION INTRAVENOUS; SUBCUTANEOUS at 21:30

## 2018-11-28 RX ADMIN — HEPARIN SODIUM 5000 UNITS: 5000 INJECTION, SOLUTION INTRAVENOUS; SUBCUTANEOUS at 05:47

## 2018-11-28 RX ADMIN — INSULIN LISPRO 2 UNITS: 100 INJECTION, SOLUTION INTRAVENOUS; SUBCUTANEOUS at 21:31

## 2018-11-28 RX ADMIN — LISINOPRIL 20 MG: 20 TABLET ORAL at 09:12

## 2018-11-28 RX ADMIN — INSULIN LISPRO 10 UNITS: 100 INJECTION, SOLUTION INTRAVENOUS; SUBCUTANEOUS at 12:40

## 2018-11-28 RX ADMIN — INSULIN LISPRO 3 UNITS: 100 INJECTION, SOLUTION INTRAVENOUS; SUBCUTANEOUS at 12:35

## 2018-11-28 RX ADMIN — ATORVASTATIN CALCIUM 20 MG: 20 TABLET, FILM COATED ORAL at 16:57

## 2018-11-28 RX ADMIN — INSULIN GLARGINE 20 UNITS: 100 INJECTION, SOLUTION SUBCUTANEOUS at 21:30

## 2018-11-28 RX ADMIN — METOPROLOL TARTRATE 25 MG: 25 TABLET, FILM COATED ORAL at 21:29

## 2018-11-28 RX ADMIN — DOCUSATE SODIUM 100 MG: 100 CAPSULE, LIQUID FILLED ORAL at 17:00

## 2018-11-28 NOTE — PHYSICIAN ADVISOR
Current patient class: Observation  The patient is currently on Hospital Day: 2 at [address]      The patient was admitted to the hospital at N/A on N/A for the following diagnosis:  Diabetes mellitus ([address] ) [E11 9]  Chest pain [R07 9]  Hypertension [I10]  Elevated TSH [R79 89]       There is documentation in the medical record of an expected length of stay of at least 2 midnights  The patient is therefore expected to satisfy the 2 midnight benchmark and given the 2 midnight presumption is appropriate for INPATIENT ADMISSION  Given this expectation of a satisfying stay, CMS instructs us that the patient is most often appropriate for inpatient admission under part A provided medical necessity is documented in the chart  After review of the relevant documentation, labs, vital signs and test results, the patient is appropriate for INPATIENT ADMISSION  Admission to the hospital as an inpatient is a complex decision making process which requires the practitioner to consider the patients presenting complaint, history and physical examination and all relevant testing  With this in mind, in this case, the patient was deemed appropriate for INPATIENT ADMISSION  After review of the documentation and testing available at the time of the admission I concur with this clinical determination of medical necessity  Rationale is as follows: The patient is a 66 yrs old Female who presented to the ED at 11/26/2018  5:54 AM with a chief complaint of Chest Pain (onset last night, mid epigastric, burning up into left neck area)     Given the need for further hospitalization, and along with the documentation of medical necessity present in the chart, the patient is appropriate for inpatient admission  The patient is expected to satisfy the 2 midnight benchmark, and will require further acute medical care   The patient does have comorbid conditions which increases the risk for significant adverse outcome  Given this the patient is appropriate for inpatient admission  The patients vitals on arrival were ED Triage Vitals   Temperature Pulse Respirations Blood Pressure SpO2   11/26/18 0555 11/26/18 0555 11/26/18 0555 11/26/18 0601 11/26/18 0555   98 3 °F (36 8 °C) 86 18 (!) 205/81 96 %      Temp Source Heart Rate Source Patient Position - Orthostatic VS BP Location FiO2 (%)   11/26/18 0555 11/26/18 0555 11/26/18 0802 11/26/18 0802 --   Oral Monitor Lying Right arm       Pain Score       11/26/18 0557       8           Past Medical History:   Diagnosis Date    Cardiac disease     Diabetes mellitus ([address] )     Hyperlipidemia     Hypertension      Past Surgical History:   Procedure Laterality Date    ANGIOPLASTY      UTERINE FIBROID SURGERY             Consults have been placed to:   IP CONSULT TO CARDIOLOGY  IP CONSULT TO CASE MANAGEMENT  IP CONSULT TO CASE MANAGEMENT  IP CONSULT TO ENDOCRINOLOGY    Vitals:    11/27/18 1513 11/27/18 1514 11/27/18 2000 11/27/18 2323   BP: 140/65  144/62 126/56   BP Location:    Right arm   Pulse: 63  62 60   Resp:   18 18   Temp: 97 9 °F (36 6 °C)  98 °F (36 7 °C) 98 °F (36 7 °C)   TempSrc:   Oral Oral   SpO2: 96%  99% 95%   Weight:  92 1 kg (203 lb)     Height:  5' 8" (1 727 m)         Most recent labs:    Recent Labs      11/26/18   0622   11/26/18   1929  11/27/18   0455   WBC  6 24   --    --   6 29   HGB  13 6   --    --   13 1   HCT  39 5   --    --   38 1   PLT  182   < >   --   177   K  3 8   --    --   3 7   CALCIUM  10 4*   --    --   10 0   BUN  18   --    --   16   CREATININE  1 02   --    --   0 84   INR  0 97   --    --   0 97   TROPONINI  0 02   < >  0 03   --    AST  14   --    --    --    ALT  23   --    --    --    ALKPHOS  89   --    --    --     < > = values in this interval not displayed         Scheduled Meds:  Current Facility-Administered Medications:  acetaminophen 650 mg Oral Q8H PRN Demi Briones MD   docusate sodium 100 mg Oral BID Ynes Estela Sloan MD   heparin (porcine) 5,000 Units Subcutaneous Q8H [address] Stewart MD   insulin glargine 20 Units Subcutaneous HS Ras Lindsay MD   insulin lispro 1-6 Units Subcutaneous TID [address], MD   insulin lispro 1-6 Units Subcutaneous HS Ynes Sloan MD   insulin lispro 10 Units Subcutaneous TID With Meals Ras Lindsay MD   lisinopril 20 mg Oral Daily Clif Nugent PA-C   metoprolol tartrate 25 mg Oral Q12H [address] Ynes Sloan MD   morphine injection 2 mg Intravenous Q4H PRN Ras Lindsay MD   nitroglycerin 0 4 mg Sublingual Q5 Min PRN Ras Lindsay MD   ondansetron 4 mg Intravenous Q6H PRN Ras Lindsay MD   senna 1 tablet Oral Daily Ynes Sloan MD     Continuous Infusions:   PRN Meds:   acetaminophen    morphine injection    nitroglycerin    ondansetron    Surgical procedures (if appropriate):

## 2018-11-28 NOTE — PROGRESS NOTES
Progress Note - Earma  1940, 66 y o  female MRN: [id number]    Unit/Bed#: -01 Encounter: [id number]    Primary Care Provider: No primary care provider on file  Date and time admitted to hospital: 2018  5:54 AM        Abnormal TSH   Assessment & Plan    Obtain free T3, T4 which are normal  Most likely represent sick euthyroid syndrome  Hypercalcemia   Assessment & Plan    pth elevated-enodcrine consult phos normal  With back and shoulder pain will xray     Essential hypertension   Assessment & Plan    Elevated on presentation  Patient did receive nitropaste, blood pressure down to 160 x 80  Continue lisinopril, low-dose of metoprolol  Weakness   Assessment & Plan    PT/OT consults-STR     * Chest pain   Assessment & Plan    Troponin negative-reproducible to palption left shoulder/trapezius-ETT negative  Will xray left shoulder and t spine             VTE Pharmacologic Prophylaxis:   Pharmacologic: Heparin  Mechanical VTE Prophylaxis in Place: Yes    Patient Centered Rounds: I have performed bedside rounds with nursing staff today  Discussions with Specialists or Other Care Team Provider:     Education and Discussions with Family / Patient:     Time Spent for Care: 30 minutes  More than 50% of total time spent on counseling and coordination of care as described above      Current Length of Stay: 1 day(s)    Current Patient Status: Inpatient   Certification Statement: The patient will continue to require additional inpatient hospital stay due to STR placement    Discharge Plan: STR    Code Status: Level 1 - Full Code      Subjective:   C/o left shoulder and back pain with lifting left shoulder    Objective:     Vitals:   Temp (24hrs), Av 1 °F (36 7 °C), Min:97 9 °F (36 6 °C), Max:98 4 °F (36 9 °C)    Temp:  [97 9 °F (36 6 °C)-98 4 °F (36 9 °C)] 98 4 °F (36 9 °C)  HR:  [60-63] 60  Resp:  [18] 18  BP: (126-147)/(56-65) 144/60  SpO2:  [94 %-99 %] 96 %  Body mass index is 31 34 kg/m²  Input and Output Summary (last 24 hours):     No intake or output data in the 24 hours ending 11/28/18 0901    Physical Exam:     Physical Exam   Constitutional: She appears well-developed and well-nourished  HENT:   Head: Normocephalic and atraumatic  Eyes: Pupils are equal, round, and reactive to light  Cardiovascular: Normal rate and regular rhythm  Pulmonary/Chest: Effort normal and breath sounds normal    Abdominal: Soft  Bowel sounds are normal    Neurological: She is alert  Additional Data:     Labs:      Results from last 7 days  Lab Units 11/27/18  0455  11/26/18  0622   WBC Thousand/uL 6 29  --  6 24   HEMOGLOBIN g/dL 13 1  --  13 6   HEMATOCRIT % 38 1  --  39 5   PLATELETS Thousands/uL 177  < > 182   NEUTROS PCT %  --   --  37*   LYMPHS PCT %  --   --  54*   MONOS PCT %  --   --  6   EOS PCT %  --   --  2   < > = values in this interval not displayed  Results from last 7 days  Lab Units 11/28/18  0443  11/26/18  0622   SODIUM mmol/L 140  < > 137   POTASSIUM mmol/L 3 9  < > 3 8   CHLORIDE mmol/L 103  < > 98*   CO2 mmol/L 30  < > 32   BUN mg/dL 17  < > 18   CREATININE mg/dL 0 84  < > 1 02   ANION GAP mmol/L 7  < > 7   CALCIUM mg/dL 10 0  < > 10 4*   ALBUMIN g/dL  --   --  3 4*   TOTAL BILIRUBIN mg/dL  --   --  1 00   ALK PHOS U/L  --   --  89   ALT U/L  --   --  23   AST U/L  --   --  14   GLUCOSE RANDOM mg/dL 98  < > 314*   < > = values in this interval not displayed  Results from last 7 days  Lab Units 11/27/18  0455   INR  0 97       Results from last 7 days  Lab Units 11/28/18  0607 11/27/18  2109 11/27/18  1653 11/27/18  1144 11/27/18  0650 11/26/18  2306 11/26/18  1657 11/26/18  1039   POC GLUCOSE mg/dl 130 149* 317* 359* 175* 184* 212* 272*       Results from last 7 days  Lab Units 11/26/18  1000   HEMOGLOBIN A1C % 10 2*               * I Have Reviewed All Lab Data Listed Above  * Additional Pertinent Lab Tests Reviewed:  All Labs Within Last 24 Hours Reviewed    Imaging:    Imaging Reports Reviewed Today Include:   Imaging Personally Reviewed by Myself Includes:      Recent Cultures (last 7 days):           Last 24 Hours Medication List:     Current Facility-Administered Medications:  acetaminophen 650 mg Oral Q8H PRN Ynes Russell MD   atorvastatin 20 mg Oral Daily With Thong Vaughan MD   docusate sodium 100 mg Oral BID Bhavana Dover MD   heparin (porcine) 5,000 Units Subcutaneous Q8H 1550 [address] Sandi Russell MD   insulin glargine 20 Units Subcutaneous HS Bhavana Dover MD   insulin lispro 1-6 Units Subcutaneous TID AC Ynes Russell MD   insulin lispro 1-6 Units Subcutaneous HS Ynes Russell MD   insulin lispro 10 Units Subcutaneous TID With Meals Bhavana Dover MD   lisinopril 20 mg Oral Daily Clif Nugent PA-C   metoprolol tartrate 25 mg Oral Q12H [address] Yens Russell MD   morphine injection 2 mg Intravenous Q4H PRN Ynes Russell MD   nitroglycerin 0 4 mg Sublingual Q5 Min PRN Ynes Mckinney MD   ondansetron 4 mg Intravenous Q6H PRN Ynes Russell MD   senna 1 tablet Oral Daily Bhavana Doevr MD        Today, Patient Was Seen By: Damian Zapata MD    ** Please Note: Dictation voice to text software may have been used in the creation of this document   **

## 2018-11-28 NOTE — TELEMEDICINE
Consultation - Tamra Atkinson 66 y o  female MRN: [id number]    Unit/Bed#: -01 Encounter: [id number]      Assessment/Plan     Assessment: This is a 66y o -year-old female with diabetes with hyperglycemia and hypercalcemia likely due to hyperparathyroidism  Plan:  1  Type 2 diabetes with hyperglycemia-her diabetes is very poorly controlled based on hemoglobin A1c  At this time, I would increase her mealtime Humalog to 14 units  Continue to monitor blood sugar over time and make adjustments to her regimen if necessary  Check urine for microalbumin  2  Hypercalcemia-this is most likely due to hyperparathyroidism  Check 25 hydroxy vitamin-D level since this can cause an elevated parathyroid level  She will need a 24 urine for calcium  Once this is completed, I would consider doing a sestamibi scan which can certainly be done as an outpatient  CC: Diabetes Consult    History of Present Illness     HPI: Tamra Atkinson is a 66y o  year old female with type 2 diabetes for Over 30 years years  She is on oral agents and insulin at home  She admits to polyuria, polydipsia, nocturia but denies blurry vision  She denies nephropathy, retinopathy and claudication but does admit to neuropathy and stroke  She denies any hypoglycemia  Her mother had diabetes  On this admission, she was noted to have hypercalcemia  Further workup showed hyperparathyroidism  She does admit to polyuria and polydipsia  There is no family history of MEN one  Inpatient consult to Endocrinology  Consult performed by: Carlos Enrique Walden  Consult ordered by: Mat MILLIGAN          Review of Systems   Constitutional: Negative for chills and fever  Respiratory: Negative for shortness of breath  Cardiovascular: Negative for chest pain  Gastrointestinal: Negative for constipation, diarrhea, nausea and vomiting  Endocrine: Positive for polydipsia and polyuria  Neurological: Positive for numbness     All other systems reviewed and are negative        Historical Information   Past Medical History:   Diagnosis Date    Cardiac disease     Diabetes mellitus ([address] )     Hyperlipidemia     Hypertension      Past Surgical History:   Procedure Laterality Date    ANGIOPLASTY      UTERINE FIBROID SURGERY       Social History   History   Alcohol Use No     History   Drug Use No     History   Smoking Status    Never Smoker   Smokeless Tobacco    Never Used     Family History:   Family History   Problem Relation Age of Onset    Diabetes Mother        Meds/Allergies   Current Facility-Administered Medications   Medication Dose Route Frequency Provider Last Rate Last Dose    acetaminophen (TYLENOL) tablet 650 mg  650 mg Oral Q8H PRN Yolande Cruz MD   650 mg at 11/26/18 1715    atorvastatin (LIPITOR) tablet 20 mg  20 mg Oral Daily With Erika Cm MD        docusate sodium (COLACE) capsule 100 mg  100 mg Oral BID Yolande Cruz MD   100 mg at 11/28/18 0912    heparin (porcine) subcutaneous injection 5,000 Units  5,000 Units Subcutaneous Q8H [address] Yolande Cruz MD   5,000 Units at 11/28/18 0547    insulin glargine (LANTUS) subcutaneous injection 20 Units 0 2 mL  20 Units Subcutaneous HS Yolande Cruz MD   20 Units at 11/27/18 2343    insulin lispro (HumaLOG) 100 units/mL subcutaneous injection 1-6 Units  1-6 Units Subcutaneous TID AC Yolande Cruz MD   3 Units at 11/28/18 1235    insulin lispro (HumaLOG) 100 units/mL subcutaneous injection 1-6 Units  1-6 Units Subcutaneous HS Ynes Wise MD   1 Units at 11/26/18 2351    insulin lispro (HumaLOG) 100 units/mL subcutaneous injection 10 Units  10 Units Subcutaneous TID With Meals Yolande Cruz MD   10 Units at 11/28/18 1240    lisinopril (ZESTRIL) tablet 20 mg  20 mg Oral Daily Clif Nugent PA-C   20 mg at 11/28/18 0912    metoprolol tartrate (LOPRESSOR) tablet 25 mg  25 mg Oral Q12H [address] Ynes Wise MD   25 mg at 11/28/18 0912    morphine injection 2 mg  2 mg Intravenous Q4H PRN Ynes Batista MD        nitroglycerin (NITROSTAT) SL tablet 0 4 mg  0 4 mg Sublingual Q5 Min PRN Mayo Lopez MD        ondansetron (ZOFRAN) injection 4 mg  4 mg Intravenous Q6H PRN Mayo Lopez MD        senna (SENOKOT) tablet 8 6 mg  1 tablet Oral Daily Mayo Lopez MD   8 6 mg at 11/28/18 0912     No Known Allergies    Objective   Vitals: Blood pressure 129/62, pulse 60, temperature 97 8 °F (36 6 °C), temperature source Oral, resp  rate 18, height 5' 8" (1 727 m), weight 93 5 kg (206 lb 2 1 oz), SpO2 97 %  Intake/Output Summary (Last 24 hours) at 11/28/18 1607  Last data filed at 11/28/18 1505   Gross per 24 hour   Intake              200 ml   Output                0 ml   Net              200 ml     Invasive Devices     Peripheral Intravenous Line            Peripheral IV 11/26/18 Right Arm 2 days                Physical Exam   Constitutional: She is oriented to person, place, and time  She appears well-developed and well-nourished  No distress  She is obese  HENT:   Head: Normocephalic and atraumatic  Mouth/Throat: Mucous membranes are normal    Eyes: Lids are normal    Neck: Normal range of motion  Pulmonary/Chest: Effort normal  No respiratory distress  Abdominal: Soft  Bowel sounds are normal  She exhibits no distension  There is no tenderness  Musculoskeletal: Normal range of motion  Lymphadenopathy:        Head (right side): No occipital adenopathy present  Head (left side): No occipital adenopathy present  Right: No supraclavicular adenopathy present  Left: No supraclavicular adenopathy present  Neurological: She is alert and oriented to person, place, and time  Skin: Skin is intact  She is not diaphoretic  Psychiatric: She has a normal mood and affect  Her behavior is normal    Vitals reviewed  The history was obtained from the review of the chart, patient      Lab Results:     Results from last 7 days  Lab Units 11/26/18  1000   HEMOGLOBIN A1C % 10 2*     Lab Results   Component Value Date    WBC 6 29 11/27/2018    HGB 13 1 11/27/2018    HCT 38 1 11/27/2018    MCV 94 11/27/2018     11/27/2018     Lab Results   Component Value Date/Time    BUN 17 11/28/2018 04:43 AM    K 3 9 11/28/2018 04:43 AM     11/28/2018 04:43 AM    CO2 30 11/28/2018 04:43 AM    CREATININE 0 84 11/28/2018 04:43 AM    AST 14 11/26/2018 06:22 AM    ALT 23 11/26/2018 06:22 AM    ALB 3 4 (L) 11/26/2018 06:22 AM     Recent Labs      11/26/18   1000   HDL  52   TRIG  133     No results found for: MICROALBUR, [id number]  POC Glucose (mg/dl)   Date Value   11/28/2018 248 (H)   11/28/2018 130   11/27/2018 149 (H)   11/27/2018 317 (H)   11/27/2018 359 (H)   11/27/2018 175 (H)   11/26/2018 184 (H)   11/26/2018 212 (H)   11/26/2018 272 (H)   05/17/2018 309 (H)       Imaging Studies: I have personally reviewed pertinent reports  REQUIRED DOCUMENTATION:     1  This service was provided via Telemedicine  2  Provider located at [facility] in center of [place]  3  TeleMed provider: Andrez Osborn MD   4  Identify all parties in room with patient during tele consult:  Patient and nurse  5  After connecting through [employer], patient was identified by name and date of birth and assistant checked wristband  Patient was then informed that this was a Telemedicine visit and that the exam was being conducted confidentially over secure lines  My office door was closed  No one else was in the room  Patient acknowledged consent and understanding of privacy and security of the Telemedicine visit, and gave us permission to have the assistant stay in the room in order to assist with the history and to conduct the exam   I informed the patient that I have reviewed their record in Epic and presented the opportunity for them to ask any questions regarding the visit today    The patient agreed to participate  Portions of the record may have been created with voice recognition software

## 2018-11-28 NOTE — PROGRESS NOTES
Cardiology Progress Note - Lidia Ball 66 y o  female MRN: [id number]    Unit/Bed#: -01 Encounter: [id number]      Assessment/ Plan:  1  Chest pain   · Troponin negative x3  · S/p Lexiscan with no noted ischemia  · Echo with EF: 65% with no RWMA, G1DD, PAP: 35mmHg, mild MR    2  Hypertension  · Well controlled with lisinopril 20mg/day and lopressor 25mg po bid    3  Uncontrolled DM2- on insulin; A1c: 10 2    4  HLD- start atorvastatin 20mg/day    She is currently pending placement  She does not have any further cardiac complaints at this time  Will sign off  Please call with questions or concerns  Will arrange for outpatient follow up  Subjective:   Patient seen and examined  No active chest pain or shortness of breath  Objective:     Vitals: Blood pressure 126/56, pulse 60, temperature 98 °F (36 7 °C), temperature source Oral, resp  rate 18, height 5' 8" (1 727 m), weight 92 1 kg (203 lb), SpO2 95 %  , Body mass index is 30 87 kg/m² , Orthostatic Blood Pressures      Most Recent Value   Blood Pressure  126/56 filed at 11/27/2018 2323   Patient Position - Orthostatic VS  Sitting filed at 11/27/2018 2323            Intake/Output Summary (Last 24 hours) at 11/27/18 2343  Last data filed at 11/27/18 0800   Gross per 24 hour   Intake              300 ml   Output              550 ml   Net             -250 ml             Physical Exam:    GEN: Lidia Ball appears well, alert and oriented x 3, pleasant and cooperative   HEENT: pupils equal, round, and reactive to light; extraocular muscles intact  NECK: supple, no carotid bruits   HEART: regular rhythm, normal S1 and S2, no murmurs, clicks, gallops or rubs   LUNGS: clear to auscultation bilaterally; no wheezes, rales, or rhonchi   ABDOMEN: normal bowel sounds, soft, no tenderness, no distention  EXTREMITIES: peripheral pulses normal; no clubbing, cyanosis, or edema      Medications:      Current Facility-Administered Medications:     acetaminophen (TYLENOL) tablet 650 mg, 650 mg, Oral, Q8H PRN, Justine Figueroa MD, 650 mg at 11/26/18 1715    docusate sodium (COLACE) capsule 100 mg, 100 mg, Oral, BID, Ynes Herbert MD, 100 mg at 11/27/18 1800    heparin (porcine) subcutaneous injection 5,000 Units, 5,000 Units, Subcutaneous, Q8H [address], 5,000 Units at 11/27/18 1531 **AND** Platelet count, , , Once, Justine Figueroa MD    insulin glargine (LANTUS) subcutaneous injection 20 Units 0 2 mL, 20 Units, Subcutaneous, HS, Ynes Herbert MD    insulin lispro (HumaLOG) 100 units/mL subcutaneous injection 1-6 Units, 1-6 Units, Subcutaneous, TID AC, 5 Units at 11/27/18 1757 **AND** Fingerstick Glucose (POCT), , , TID AC, Ynes Herbert MD    insulin lispro (HumaLOG) 100 units/mL subcutaneous injection 1-6 Units, 1-6 Units, Subcutaneous, HS, Ynes Herbert MD, 1 Units at 11/26/18 2351    insulin lispro (HumaLOG) 100 units/mL subcutaneous injection 10 Units, 10 Units, Subcutaneous, TID With Meals, Justine Figueroa MD, 10 Units at 11/27/18 1757    lisinopril (ZESTRIL) tablet 20 mg, 20 mg, Oral, Daily, Clif Nugent PA-C, 20 mg at 11/27/18 0803    metoprolol tartrate (LOPRESSOR) tablet 25 mg, 25 mg, Oral, Q12H [address], Ynes Herbert MD, 25 mg at 11/27/18 0800    morphine injection 2 mg, 2 mg, Intravenous, Q4H PRN, Justine Figueroa MD    nitroglycerin (NITROSTAT) SL tablet 0 4 mg, 0 4 mg, Sublingual, Q5 Min PRN, Ynes Herbert MD    ondansetron (ZOFRAN) injection 4 mg, 4 mg, Intravenous, Q6H PRN, Justine Figueroa MD    senna (SENOKOT) tablet 8 6 mg, 1 tablet, Oral, Daily, Ynes Carrington MD, 8 6 mg at 11/27/18 0800     Labs & Results:      Results from last 7 days  Lab Units 11/26/18  1929 11/26/18  1525 11/26/18  1000   TROPONIN I ng/mL 0 03 0 02 <0 02       Results from last 7 days  Lab Units 11/27/18  0455 11/26/18  1000 11/26/18  0622   WBC Thousand/uL 6 29  --  6 24   HEMOGLOBIN g/dL 13 1  --  13 6   HEMATOCRIT % 38 1  --  39 5 PLATELETS Thousands/uL 177 187 182       Results from last 7 days  Lab Units 11/26/18  1000   TRIGLYCERIDES mg/dL 133   HDL mg/dL 52       Results from last 7 days  Lab Units 11/27/18 0455 11/26/18 0622   POTASSIUM mmol/L 3 7 3 8   CHLORIDE mmol/L 103 98*   CO2 mmol/L 31 32   BUN mg/dL 16 18   CREATININE mg/dL 0 84 1 02   CALCIUM mg/dL 10 0 10 4*   ALK PHOS U/L  --  89   ALT U/L  --  23   AST U/L  --  14       Results from last 7 days  Lab Units 11/27/18 0455 11/26/18 0622   INR  0 97 0 97   PTT seconds  --  25*       Results from last 7 days  Lab Units 11/27/18 0455 11/26/18 0622   MAGNESIUM mg/dL 1 8 1 8             Counseling / Coordination of Care  Total floor / unit time spent today 30 minutes  Greater than 50% of total time was spent with the patient and / or family counseling and / or coordination of care

## 2018-11-28 NOTE — ASSESSMENT & PLAN NOTE
Troponin negative-reproducible to palption left shoulder/trapezius-ETT negative  Will xray left shoulder and t spine

## 2018-11-28 NOTE — PHYSICAL THERAPY NOTE
Physical Therapy Cancellation Note    Chart review performed  At this time, PT treatment session cancelled as patient is pending results of L shoulder x-ray and thoracic spine x-ray  PT will hold on treatment session until after results of such are reviewed by team  PT will follow and provide PT interventions as appropriate      Elo Mann, PT

## 2018-11-28 NOTE — UTILIZATION REVIEW
OBS  11/26  0714  Converted to IP on 11/27 @  7209 for continued cardiac w/u     Admitting Physician Raylene Goltz R    Level of Care Med Surg    Estimated length of stay More than 2 Midnights    Certification I certify that inpatient services are medically necessary for this patient for a duration of greater than two midnights   See H&P and MD Progress Notes for additional information about the patient's course of treatment

## 2018-11-29 LAB
25(OH)D3 SERPL-MCNC: 18 NG/ML (ref 30–100)
GLUCOSE SERPL-MCNC: 186 MG/DL (ref 65–140)
GLUCOSE SERPL-MCNC: 264 MG/DL (ref 65–140)
GLUCOSE SERPL-MCNC: 304 MG/DL (ref 65–140)
GLUCOSE SERPL-MCNC: 99 MG/DL (ref 65–140)

## 2018-11-29 PROCEDURE — 99232 SBSQ HOSP IP/OBS MODERATE 35: CPT | Performed by: GENERAL PRACTICE

## 2018-11-29 PROCEDURE — 82306 VITAMIN D 25 HYDROXY: CPT | Performed by: GENERAL PRACTICE

## 2018-11-29 PROCEDURE — 82948 REAGENT STRIP/BLOOD GLUCOSE: CPT

## 2018-11-29 RX ORDER — LIDOCAINE 50 MG/G
2 PATCH TOPICAL DAILY
Status: DISCONTINUED | OUTPATIENT
Start: 2018-11-29 | End: 2018-12-02 | Stop reason: HOSPADM

## 2018-11-29 RX ADMIN — DOCUSATE SODIUM 100 MG: 100 CAPSULE, LIQUID FILLED ORAL at 09:10

## 2018-11-29 RX ADMIN — HEPARIN SODIUM 5000 UNITS: 5000 INJECTION, SOLUTION INTRAVENOUS; SUBCUTANEOUS at 05:54

## 2018-11-29 RX ADMIN — LISINOPRIL 20 MG: 20 TABLET ORAL at 09:10

## 2018-11-29 RX ADMIN — METOPROLOL TARTRATE 25 MG: 25 TABLET, FILM COATED ORAL at 09:10

## 2018-11-29 RX ADMIN — INSULIN GLARGINE 20 UNITS: 100 INJECTION, SOLUTION SUBCUTANEOUS at 22:16

## 2018-11-29 RX ADMIN — INSULIN LISPRO 1 UNITS: 100 INJECTION, SOLUTION INTRAVENOUS; SUBCUTANEOUS at 17:07

## 2018-11-29 RX ADMIN — ATORVASTATIN CALCIUM 20 MG: 20 TABLET, FILM COATED ORAL at 17:06

## 2018-11-29 RX ADMIN — HEPARIN SODIUM 5000 UNITS: 5000 INJECTION, SOLUTION INTRAVENOUS; SUBCUTANEOUS at 22:16

## 2018-11-29 RX ADMIN — DOCUSATE SODIUM 100 MG: 100 CAPSULE, LIQUID FILLED ORAL at 17:06

## 2018-11-29 RX ADMIN — INSULIN LISPRO 3 UNITS: 100 INJECTION, SOLUTION INTRAVENOUS; SUBCUTANEOUS at 09:10

## 2018-11-29 RX ADMIN — INSULIN LISPRO 4 UNITS: 100 INJECTION, SOLUTION INTRAVENOUS; SUBCUTANEOUS at 12:08

## 2018-11-29 RX ADMIN — METOPROLOL TARTRATE 25 MG: 25 TABLET, FILM COATED ORAL at 20:13

## 2018-11-29 RX ADMIN — SENNOSIDES 8.6 MG: 8.6 TABLET, FILM COATED ORAL at 09:10

## 2018-11-29 RX ADMIN — HEPARIN SODIUM 5000 UNITS: 5000 INJECTION, SOLUTION INTRAVENOUS; SUBCUTANEOUS at 15:00

## 2018-11-29 RX ADMIN — LIDOCAINE 2 PATCH: 50 PATCH CUTANEOUS at 12:07

## 2018-11-29 NOTE — ASSESSMENT & PLAN NOTE
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

## 2018-11-29 NOTE — ASSESSMENT & PLAN NOTE
Lab Results   Component Value Date    HGBA1C 10 2 (H) 11/26/2018       Recent Labs      11/28/18   1153  11/28/18   1624  11/28/18   2116  11/29/18   0701   POCGLU  248*  233*  220*  264*       Blood Sugar Average: Last 72 hrs:  (P) 230  25Obtain hemoglobin A1c  Continue Lantus, pre meal NovoLog and sliding scale insulin  Blood sugars uncontrolled on presentation      Endocrine consult appreciated

## 2018-11-29 NOTE — PROGRESS NOTES
Progress Note - Apurva Diaz 1940, 66 y o  female MRN: [id number]    Unit/Bed#: -01 Encounter: [id number]    Primary Care Provider: No primary care provider on file  Date and time admitted to hospital: 11/26/2018  5:54 AM        Abnormal TSH   Assessment & Plan    Obtain free T3, T4 which are normal  Most likely represent sick euthyroid syndrome  Hypercalcemia   Assessment & Plan    pth elevated-enodcrine consult phos normal  Vit d level pending  Endocrine consult appreciated     Essential hypertension   Assessment & Plan    Elevated on presentation  Patient did receive nitropaste, blood pressure down to 160 x 80  Continue lisinopril, low-dose of metoprolol  Type 2 diabetes mellitus [facility])   Assessment & Plan    Lab Results   Component Value Date    HGBA1C 10 2 (H) 11/26/2018       Recent Labs      11/28/18   1153  11/28/18   1624  11/28/18   2116  11/29/18   0701   POCGLU  248*  233*  220*  264*       Blood Sugar Average: Last 72 hrs:  (P) 230  25Obtain hemoglobin A1c  Continue Lantus, pre meal NovoLog and sliding scale insulin  Blood sugars uncontrolled on presentation  Endocrine consult appreciated       * Chest pain   Assessment & Plan    Troponin negative-reproducible to palption left shoulder/trapezius-ETT negative  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  lidoderm patch             VTE Pharmacologic Prophylaxis:   Pharmacologic: Heparin  Mechanical VTE Prophylaxis in Place: Yes    Patient Centered Rounds: I have performed bedside rounds with nursing staff today  Discussions with Specialists or Other Care Team Provider:     Education and Discussions with Family / Patient:     Time Spent for Care: 30 minutes  More than 50% of total time spent on counseling and coordination of care as described above      Current Length of Stay: 2 day(s)    Current Patient Status: Inpatient   Certification Statement: The patient will continue to require additional inpatient hospital stay due to awaiting STR    Discharge Plan: STR    Code Status: Level 1 - Full Code      Subjective:   C/o left shoulder pain    Objective:     Vitals:   Temp (24hrs), Av 2 °F (36 8 °C), Min:97 8 °F (36 6 °C), Max:98 5 °F (36 9 °C)    Temp:  [97 8 °F (36 6 °C)-98 5 °F (36 9 °C)] 98 5 °F (36 9 °C)  HR:  [60-63] 60  Resp:  [18] 18  BP: (128-159)/(62-86) 159/68  SpO2:  [95 %-97 %] 95 %  Body mass index is 31 11 kg/m²  Input and Output Summary (last 24 hours): Intake/Output Summary (Last 24 hours) at 18 1041  Last data filed at 18 0311   Gross per 24 hour   Intake              200 ml   Output              750 ml   Net             -550 ml       Physical Exam:     Physical Exam   Constitutional: She is oriented to person, place, and time  She appears well-developed and well-nourished  HENT:   Head: Normocephalic and atraumatic  Eyes: Pupils are equal, round, and reactive to light  Cardiovascular: Normal rate and regular rhythm  Pulmonary/Chest: Effort normal and breath sounds normal    Abdominal: Soft  Musculoskeletal: She exhibits no edema  Neurological: She is alert and oriented to person, place, and time  Additional Data:     Labs:      Results from last 7 days  Lab Units 18  0455  18  0622   WBC Thousand/uL 6 29  --  6 24   HEMOGLOBIN g/dL 13 1  --  13 6   HEMATOCRIT % 38 1  --  39 5   PLATELETS Thousands/uL 177  < > 182   NEUTROS PCT %  --   --  37*   LYMPHS PCT %  --   --  54*   MONOS PCT %  --   --  6   EOS PCT %  --   --  2   < > = values in this interval not displayed      Results from last 7 days  Lab Units 18  0443  18  0622   SODIUM mmol/L 140  < > 137   POTASSIUM mmol/L 3 9  < > 3 8   CHLORIDE mmol/L 103  < > 98*   CO2 mmol/L 30  < > 32   BUN mg/dL 17  < > 18   CREATININE mg/dL 0 84  < > 1 02   ANION GAP mmol/L 7  < > 7   CALCIUM mg/dL 10 0  < > 10 4*   ALBUMIN g/dL  --   --  3 4*   TOTAL BILIRUBIN mg/dL  --   --  1 00 ALK PHOS U/L  --   --  89   ALT U/L  --   --  23   AST U/L  --   --  14   GLUCOSE RANDOM mg/dL 98  < > 314*   < > = values in this interval not displayed  Results from last 7 days  Lab Units 11/27/18  0455   INR  0 97       Results from last 7 days  Lab Units 11/29/18  0701 11/28/18  2116 11/28/18  1624 11/28/18  1153 11/28/18  0607 11/27/18  2109 11/27/18  1653 11/27/18  1144 11/27/18  0650 11/26/18  2306 11/26/18  1657 11/26/18  1039   POC GLUCOSE mg/dl 264* 220* 233* 248* 130 149* 317* 359* 175* 184* 212* 272*       Results from last 7 days  Lab Units 11/26/18  1000   HEMOGLOBIN A1C % 10 2*               * I Have Reviewed All Lab Data Listed Above  * Additional Pertinent Lab Tests Reviewed:  All Labs Within Last 24 Hours Reviewed    Imaging:    Imaging Reports Reviewed Today Include:   Imaging Personally Reviewed by Myself Includes:      Recent Cultures (last 7 days):           Last 24 Hours Medication List:     Current Facility-Administered Medications:  acetaminophen 650 mg Oral Q8H PRN Ynes Bowman MD   atorvastatin 20 mg Oral Daily With Rowdy Kwok MD   docusate sodium 100 mg Oral BID Chang Metzger MD   heparin (porcine) 5,000 Units Subcutaneous Q8H Geno Lawrence MD   insulin glargine 20 Units Subcutaneous HS Chang Metzger MD   insulin lispro 1-6 Units Subcutaneous TID Geno Lawrence MD   insulin lispro 1-6 Units Subcutaneous HS Chang Metzger MD   insulin lispro 14 Units Subcutaneous TID With Meals Fiorella Wagner MD   lisinopril 20 mg Oral Daily Clif Nugent PA-C   metoprolol tartrate 25 mg Oral Q12H [address] Ynes Bowman MD   morphine injection 2 mg Intravenous Q4H PRN Ynes Bowman MD   nitroglycerin 0 4 mg Sublingual Q5 Min PRN Ynes Concepcion MD   ondansetron 4 mg Intravenous Q6H PRN Chang Metzger MD   senna 1 tablet Oral Daily Chang Metzger MD        Today, Patient Was Seen By: Jenny Monsalve MD    ** Please Note: Dictation voice to text software may have been used in the creation of this document   **

## 2018-11-29 NOTE — SOCIAL WORK
Follow up call received from Ghazala,liaison from [facility] reported patient has been accepted at their [facility] transitional unit however, they are unable to confirm a bed and will be able to confirm bed status by the end of this day

## 2018-11-30 LAB
BACTERIA UR QL AUTO: NORMAL /HPF
BILIRUB UR QL STRIP: NEGATIVE
CLARITY UR: CLEAR
COLOR UR: YELLOW
GLUCOSE SERPL-MCNC: 146 MG/DL (ref 65–140)
GLUCOSE SERPL-MCNC: 249 MG/DL (ref 65–140)
GLUCOSE SERPL-MCNC: 274 MG/DL (ref 65–140)
GLUCOSE SERPL-MCNC: 327 MG/DL (ref 65–140)
GLUCOSE UR STRIP-MCNC: ABNORMAL MG/DL
HGB UR QL STRIP.AUTO: NEGATIVE
KETONES UR STRIP-MCNC: NEGATIVE MG/DL
LEUKOCYTE ESTERASE UR QL STRIP: ABNORMAL
MAGNESIUM SERPL-MCNC: 1.8 MG/DL (ref 1.6–2.6)
NITRITE UR QL STRIP: NEGATIVE
NON-SQ EPI CELLS URNS QL MICRO: NORMAL /HPF
PH UR STRIP.AUTO: 6 [PH] (ref 4.5–8)
PHOSPHATE SERPL-MCNC: 2.7 MG/DL (ref 2.3–4.1)
PROT UR STRIP-MCNC: ABNORMAL MG/DL
RBC #/AREA URNS AUTO: NORMAL /HPF
SP GR UR STRIP.AUTO: 1.02 (ref 1–1.03)
UROBILINOGEN UR QL STRIP.AUTO: 2 E.U./DL
WBC #/AREA URNS AUTO: NORMAL /HPF

## 2018-11-30 PROCEDURE — 82948 REAGENT STRIP/BLOOD GLUCOSE: CPT

## 2018-11-30 PROCEDURE — 97530 THERAPEUTIC ACTIVITIES: CPT

## 2018-11-30 PROCEDURE — 84100 ASSAY OF PHOSPHORUS: CPT | Performed by: INTERNAL MEDICINE

## 2018-11-30 PROCEDURE — 99232 SBSQ HOSP IP/OBS MODERATE 35: CPT | Performed by: GENERAL PRACTICE

## 2018-11-30 PROCEDURE — 83735 ASSAY OF MAGNESIUM: CPT | Performed by: INTERNAL MEDICINE

## 2018-11-30 PROCEDURE — 97535 SELF CARE MNGMENT TRAINING: CPT

## 2018-11-30 PROCEDURE — 97110 THERAPEUTIC EXERCISES: CPT

## 2018-11-30 PROCEDURE — 81001 URINALYSIS AUTO W/SCOPE: CPT | Performed by: FAMILY MEDICINE

## 2018-11-30 RX ADMIN — MORPHINE SULFATE 2 MG: 2 INJECTION, SOLUTION INTRAMUSCULAR; INTRAVENOUS at 21:44

## 2018-11-30 RX ADMIN — DOCUSATE SODIUM 100 MG: 100 CAPSULE, LIQUID FILLED ORAL at 17:54

## 2018-11-30 RX ADMIN — METOPROLOL TARTRATE 25 MG: 25 TABLET, FILM COATED ORAL at 21:42

## 2018-11-30 RX ADMIN — ACETAMINOPHEN 650 MG: 325 TABLET ORAL at 13:33

## 2018-11-30 RX ADMIN — HEPARIN SODIUM 5000 UNITS: 5000 INJECTION, SOLUTION INTRAVENOUS; SUBCUTANEOUS at 21:43

## 2018-11-30 RX ADMIN — INSULIN LISPRO 4 UNITS: 100 INJECTION, SOLUTION INTRAVENOUS; SUBCUTANEOUS at 22:53

## 2018-11-30 RX ADMIN — INSULIN LISPRO 5 UNITS: 100 INJECTION, SOLUTION INTRAVENOUS; SUBCUTANEOUS at 17:54

## 2018-11-30 RX ADMIN — HEPARIN SODIUM 5000 UNITS: 5000 INJECTION, SOLUTION INTRAVENOUS; SUBCUTANEOUS at 06:01

## 2018-11-30 RX ADMIN — DOCUSATE SODIUM 100 MG: 100 CAPSULE, LIQUID FILLED ORAL at 08:44

## 2018-11-30 RX ADMIN — METOPROLOL TARTRATE 25 MG: 25 TABLET, FILM COATED ORAL at 08:44

## 2018-11-30 RX ADMIN — ATORVASTATIN CALCIUM 20 MG: 20 TABLET, FILM COATED ORAL at 15:34

## 2018-11-30 RX ADMIN — Medication 400 MG: at 21:43

## 2018-11-30 RX ADMIN — LIDOCAINE 2 PATCH: 50 PATCH CUTANEOUS at 08:44

## 2018-11-30 RX ADMIN — INSULIN LISPRO 3 UNITS: 100 INJECTION, SOLUTION INTRAVENOUS; SUBCUTANEOUS at 11:31

## 2018-11-30 RX ADMIN — INSULIN GLARGINE 20 UNITS: 100 INJECTION, SOLUTION SUBCUTANEOUS at 21:43

## 2018-11-30 RX ADMIN — LISINOPRIL 20 MG: 20 TABLET ORAL at 08:44

## 2018-11-30 RX ADMIN — SENNOSIDES 8.6 MG: 8.6 TABLET, FILM COATED ORAL at 08:44

## 2018-11-30 RX ADMIN — HEPARIN SODIUM 5000 UNITS: 5000 INJECTION, SOLUTION INTRAVENOUS; SUBCUTANEOUS at 15:34

## 2018-11-30 NOTE — ASSESSMENT & PLAN NOTE
Lab Results   Component Value Date    HGBA1C 10 2 (H) 11/26/2018       Recent Labs      11/29/18   1127  11/29/18   1551  11/29/18 2059 11/30/18   0603   POCGLU  304*  186*  99  146*       Blood Sugar Average: Last 72 hrs:  (P) 217 [id number] hemoglobin A1c  Continue Lantus, pre meal NovoLog and sliding scale insulin  Blood sugars uncontrolled on presentation      Endocrine consult appreciated

## 2018-11-30 NOTE — ASSESSMENT & PLAN NOTE
pth elevated-enodcrine consult phos normal  Vit d level low-will need outpatient follow up of hyperparathyroidism  Endocrine consult appreciated

## 2018-11-30 NOTE — PROGRESS NOTES
Progress Note - Apurva Diaz 1940, 66 y o  female MRN: [id number]    Unit/Bed#: -01 Encounter: [id number]    Primary Care Provider: No primary care provider on file  Date and time admitted to hospital: 11/26/2018  5:54 AM      Hyperparathyroidism, POA,  in the setting of hypercalcemia, a/e/b  3 and Ca 10 4, requiring additional lab monitoring and studies and endocrine consultation  Abnormal TSH   Assessment & Plan    Obtain free T3, T4 which are normal  Most likely represent sick euthyroid syndrome  Hypercalcemia   Assessment & Plan    pth elevated-enodcrine consult phos normal  Vit d level low-will need outpatient follow up of hyperparathyroidism  Endocrine consult appreciated     Essential hypertension   Assessment & Plan    Elevated on presentation  Patient did receive nitropaste, blood pressure down to 160 x 80  Continue lisinopril, low-dose of metoprolol  Type 2 diabetes mellitus [facility])   Assessment & Plan    Lab Results   Component Value Date    HGBA1C 10 2 (H) 11/26/2018       Recent Labs      11/29/18   1127  11/29/18   1551  11/29/18   2059  11/30/18   0603   POCGLU  304*  186*  99  146*       Blood Sugar Average: Last 72 hrs:  (P) 217 [id number] hemoglobin A1c  Continue Lantus, pre meal NovoLog and sliding scale insulin  Blood sugars uncontrolled on presentation  Endocrine consult appreciated       * Chest pain   Assessment & Plan    Troponin negative-reproducible to palption left shoulder/trapezius-ETT negative  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  lidoderm patch                   Discharging Physician / Practitioner: Criss Phillips MD  PCP: No primary care provider on file    Admission Date:   Admission Orders     Ordered        11/27/18 2341  Inpatient Admission  Once         11/26/18 6771  Place in Observation (expected length of stay for this patient is less than two midnights)  Once             Discharge Date: 11/30/18    Resolved Problems  Date Reviewed: 11/30/2018    None          Consultations During Hospital Stay:  · Endocrine and cardiology    Procedures Performed:     ·     Significant Findings / Test Results:     · Hyperparathyroidism  · Left shoulder DJD    Incidental Findings:   · Poorly controlled type 2 diabetes     Test Results Pending at Discharge (will require follow up):   ·      Outpatient Tests Requested:  · 24 hour urine calcium    Complications:      Reason for Admission: chest pain    Hospital Course:     Carol Ann Srivastava is a 66 y o  female patient who originally presented to the hospital on 11/26/2018 due to chest pain    Please see above list of diagnoses and related plan for additional information  Condition at Discharge: stable     Discharge Day Visit / Exam:     Subjective:  Left shoulder pain better with lidodrm patch  Vitals: Blood Pressure: 154/70 (11/30/18 0700)  Pulse: 62 (11/30/18 0700)  Temperature: 98 1 °F (36 7 °C) (11/30/18 0700)  Temp Source: Oral (11/30/18 0700)  Respirations: 18 (11/30/18 0700)  Height: 5' 8" (172 7 cm) (11/27/18 1514)  Weight - Scale: 93 1 kg (205 lb 4 oz) (11/30/18 0600)  SpO2: 97 % (11/30/18 0700)  Exam:   Physical Exam   Constitutional: She is oriented to person, place, and time  She appears well-developed and well-nourished  HENT:   Head: Normocephalic and atraumatic  Eyes: Pupils are equal, round, and reactive to light  Cardiovascular: Normal rate and regular rhythm  Pulmonary/Chest: Effort normal and breath sounds normal    Abdominal: Soft  Bowel sounds are normal    Musculoskeletal: She exhibits no edema  Neurological: She is alert and oriented to person, place, and time  Discussion with Family:     Discharge instructions/Information to patient and family:   See after visit summary for information provided to patient and family        Provisions for Follow-Up Care:  See after visit summary for information related to follow-up care and any pertinent home health orders  Disposition:     Acute Rehab at [address] [place] [address]    For Discharges to [facility] SNF:   · Not Applicable to this Patient - Not Applicable to this Patient    Planned Readmission:      Discharge Statement:  I spent 40 minutes discharging the patient  This time was spent on the day of discharge  I had direct contact with the patient on the day of discharge  Greater than 50% of the total time was spent examining patient, answering all patient questions, arranging and discussing plan of care with patient as well as directly providing post-discharge instructions  Additional time then spent on discharge activities  Discharge Medications:  See after visit summary for reconciled discharge medications provided to patient and family        ** Please Note: This note has been constructed using a voice recognition system **

## 2018-11-30 NOTE — PLAN OF CARE
CARDIOVASCULAR - ADULT     Absence of cardiac dysrhythmias or at baseline rhythm Progressing        MUSCULOSKELETAL - ADULT     Maintain or return mobility to safest level of function Progressing        Nutrition/Hydration-ADULT     Nutrient/Hydration intake appropriate for improving, restoring or maintaining nutritional needs Progressing        PAIN - ADULT     Verbalizes/displays adequate comfort level or baseline comfort level Progressing        Potential for Falls     Patient will remain free of falls Progressing        SAFETY ADULT     Maintain or return to baseline ADL function Progressing     Maintain or return mobility status to optimal level Progressing     Patient will remain free of falls Progressing

## 2018-11-30 NOTE — SOCIAL WORK
Cm informed by by [facility] liaison that they will follow up with cm regarding bed status, patient has been accepted but facility awaiting confirmation of bed status and will follow up with cm by 1pm this day  Cm contacted patient son, Edyta Ayala and left a vmail requesting a return phone call  Cm contacted patient daughter Rama Aparicio who agreed to referrals being sent to -[facility]   Cm requesting updated Physical therapy notes

## 2018-11-30 NOTE — PHYSICAL THERAPY NOTE
Physical Therapy Treatment Note       11/30/18 1200   Pain Assessment   Pain Assessment No/denies pain   Pain Score No Pain   Restrictions/Precautions   Weight Bearing Precautions Per Order No   Braces or Orthoses (none per pt)   Other Precautions Chair Alarm; Fall Risk   General   Chart Reviewed Yes   Response to Previous Treatment Patient with no complaints from previous session  Family/Caregiver Present No   Cognition   Overall Cognitive Status Impaired   Arousal/Participation Alert; Responsive; Cooperative   Attention Attends with cues to redirect   Orientation Level Oriented to person;Oriented to time;Oriented to situation;Disoriented to place   Memory Decreased short term memory;Decreased recall of precautions;Decreased recall of recent events   Following Commands Follows one step commands with increased time or repetition   Comments pt agreeable to PT session   Subjective   Subjective "I feel ok today"   Bed Mobility   Rolling R Unable to assess  (pt received OOB in recliner)   Transfers   Sit to Stand 4  Minimal assistance   Additional items Assist x 1;Assist x 2; Increased time required;Verbal cues;Armrests  (Ax1-2)   Stand to Sit 4  Minimal assistance   Additional items Assist x 1;Assist x 2;Verbal cues;Armrests  (Ax1-2)   Additional Comments co-treat w/ OT as pt requiring Ax2 for functional mobility and increased VC/TCs for safety   Ambulation/Elevation   Gait pattern Decreased foot clearance; Wide GIFTY; Forward Flexion; Short stride; Step to;Excessively slow; Shuffling   Gait Assistance 4  Minimal assist   Additional items Assist x 2;Verbal cues; Tactile cues   Assistive Device Rolling walker   Distance 5' forward/backward   Balance   Static Sitting Fair   Dynamic Sitting Fair -   Static Standing Fair -   Dynamic Standing Poor +   Ambulatory Poor +   Endurance Deficit   Endurance Deficit Yes   Activity Tolerance   Activity Tolerance Patient limited by fatigue   Nurse Made Aware Yes, RN Kaitlynn Ross verbalized pt appropriate for PT session, made aware of outcomes/recs   Exercises   Heelslides Sitting;10 reps;AROM; Right;Left   Hip Abduction Sitting;10 reps;AROM; Right;Left   Hip Adduction Sitting;10 reps;AROM; Right;Left   Knee AROM Long Arc Quad Sitting;10 reps;AROM; Right;Left   Marching Sitting;10 reps;AROM; Right;Left   Assessment   Prognosis Fair   Problem List Decreased strength;Decreased endurance; Impaired balance;Decreased mobility; Decreased cognition;Decreased safety awareness;Pain; Impaired sensation   Assessment Pt seen for PT treatment session this date with interventions consisting of gait training w/ emphasis on improving pt's ability to ambulate level surfaces x 5 ft with min A of 2 provided by therapist with RW, Therapeutic exercise consisting of: AROM 10 reps B LE in sitting position and therapeutic activity consisting of training: sit<>stand transfers and vc and tactile cues for static standing posture faciliation  Pt agreeable to PT treatment session upon arrival, pt found seated OOB in recliner, in no apparent distress and responsive  In comparison to previous session, pt with improvements in tolerating greater amb distances w/ less physical A required  Pt fearful of falling, expressing concerns with being able to amb further 2/2 weakness  Post session: pt returned back to recliner, chair alarm engaged, all needs in reach and RN notified of session findings/recommendations  Continue to recommend STR at time of d/c in order to maximize pt's functional independence and safety w/ mobility  Pt continues to be functioning below baseline level, and remains limited 2* factors listed above  PT will continue to see pt while here in order to address the deficits listed above and provide interventions consistent w/ POC in effort to achieve STGs  Barriers to Discharge Decreased caregiver support; Inaccessible home environment   Goals   Patient Goals "to run out of here"   STG Expiration Date 12/07/18   Short Term Goal #1 STGs remain appropriate   Treatment Day 1   Plan   Treatment/Interventions Functional transfer training;LE strengthening/ROM; Therapeutic exercise; Endurance training;Patient/family training;Equipment eval/education; Bed mobility;Gait training;Continued evaluation;Spoke to nursing;Spoke to case management;OT   Progress Slow progress, decreased activity tolerance   PT Frequency (3-5x/wk)   Recommendation   Recommendation Short-term skilled PT   Equipment Recommended Walker  (RW at this time)   PT - OK to Discharge Yes  (when medically cleared if to STR)       Yuan Sampson, PT    Time of PT treatment session: 5834-0660

## 2018-11-30 NOTE — PLAN OF CARE
Problem: OCCUPATIONAL THERAPY ADULT  Goal: Performs self-care activities at highest level of function for planned discharge setting  See evaluation for individualized goals  Treatment Interventions: ADL retraining, Functional transfer training, UE strengthening/ROM, Endurance training, Patient/family training, Equipment evaluation/education, Fine motor coordination activities, Compensatory technique education, Continued evaluation, Energy conservation, Activityengagement          See flowsheet documentation for full assessment, interventions and recommendations  Outcome: Progressing  Limitation: Decreased ADL status, Decreased UE strength, Decreased UE ROM, Decreased Safe judgement during ADL, Decreased endurance, Decreased cognition, Decreased self-care trans, Decreased high-level ADLs  Prognosis: Good  Assessment: Pt seen for skilled OT session focused on ADLs, functional transfers  Pt required min assist for grooming tasks of washing face, denture care while seated w/ increased time  Pt w/ MIN assist assist for UB Bathing  Pt w/ MIN assist and increased time to don/doff hospital gown  Pt w/ MOD assist LB Bathing  Pt w/ MOD assist for LB Dressing, pt w/ assist to don/doff socks, pt able to thread L LE through underpants and required assist to thread R LE through underpants and able to pull up to knees while seated  Pt seated in recliner at start of session  Pt w/ MIN assist x1-2 sit>stand from recliner w/ VCs for hand positioning  Pt w/ MIN assist steadying w/ RW and min assist to pull up underpants over hips while in stance for 2 minutes  Pt w/ MIN assist x1 stand>sit w/ cues for hand placement  Pt w/ setup for lunch  Pt seated in recliner w/ LEs elevated and chair alarm intact at end of session   Pt continues to be limited due to decreased strength and endurance, impaired functional reach, fair activity tolerance, impaired cognition (requires reorientation), all causing a decline in ADLs, functional transfers and mobility  Recommend STR when medically stable  Will continue to follow to address OT POC       OT Discharge Recommendation: Short Term Rehab         Comments: John Reyes MS, OTR/L

## 2018-11-30 NOTE — PLAN OF CARE
Problem: PHYSICAL THERAPY ADULT  Goal: Performs mobility at highest level of function for planned discharge setting  See evaluation for individualized goals  Treatment/Interventions: Functional transfer training, LE strengthening/ROM, Therapeutic exercise, Endurance training, Patient/family training, Equipment eval/education, Bed mobility, Gait training, Continued evaluation, Spoke to nursing, Spoke to case management, OT  Equipment Recommended:  (TBD at [facility])       See flowsheet documentation for full assessment, interventions and recommendations  Outcome: Progressing  Prognosis: Fair  Problem List: Decreased strength, Decreased endurance, Impaired balance, Decreased mobility, Decreased cognition, Decreased safety awareness, Pain, Impaired sensation  Assessment: Pt seen for PT treatment session this date with interventions consisting of gait training w/ emphasis on improving pt's ability to ambulate level surfaces x 5 ft with min A of 2 provided by therapist with RW, Therapeutic exercise consisting of: AROM 10 reps B LE in sitting position and therapeutic activity consisting of training: sit<>stand transfers and vc and tactile cues for static standing posture faciliation  Pt agreeable to PT treatment session upon arrival, pt found seated OOB in recliner, in no apparent distress and responsive  In comparison to previous session, pt with improvements in tolerating greater amb distances w/ less physical A required  Pt fearful of falling, expressing concerns with being able to amb further 2/2 weakness  Post session: pt returned back to recliner, chair alarm engaged, all needs in reach and RN notified of session findings/recommendations  Continue to recommend STR at time of d/c in order to maximize pt's functional independence and safety w/ mobility  Pt continues to be functioning below baseline level, and remains limited 2* factors listed above   PT will continue to see pt while here in order to address the deficits listed above and provide interventions consistent w/ POC in effort to achieve STGs  Barriers to Discharge: Decreased caregiver support, Inaccessible home environment     Recommendation: Short-term skilled PT     PT - OK to Discharge: Yes (when medically cleared if to STR)    See flowsheet documentation for full assessment

## 2018-11-30 NOTE — OCCUPATIONAL THERAPY NOTE
OccupationalTherapy Progress Note     Patient Name: Carol Ann Srivastava  Today's Date: 11/30/2018  Problem List  Patient Active Problem List   Diagnosis    Weakness    Type 2 diabetes mellitus ([facility] [address] )    Essential hypertension    Chest pain    Hypercalcemia    Abnormal TSH         11/30/18 1210   Restrictions/Precautions   Weight Bearing Precautions Per Order No   Other Precautions Cognitive; Chair Alarm; Bed Alarm; Fall Risk   Pain Assessment   Pain Assessment No/denies pain   Pain Score No Pain   ADL   Where Assessed Chair   Eating Assistance 5  Supervision/Setup   Eating Deficit Setup; Increased time to complete;Supervision/safety   Eating Comments increased time to complete   Grooming Assistance 4  Minimal Assistance   Grooming Deficit Setup;Verbal cueing;Supervision/safety; Increased time to complete;Wash/dry hands; Wash/dry face;Denture care   Grooming Comments to ensure cleanliness   UB Bathing Assistance 4  Minimal Assistance   UB Bathing Deficit Setup; Increased time to complete;Supervision/safety   LB Bathing Assistance 3  Moderate Assistance   LB Bathing Deficit Setup;Steadying;Verbal cueing;Supervision/safety; Increased time to complete;Left lower leg including foot;Right lower leg including foot   LB Bathing Comments steadying assist in stance   UB Dressing Assistance 4  Minimal Assistance   UB Dressing Deficit Setup;Verbal cueing;Supervision/safety; Increased time to complete   UB Dressing Comments increased time to complete   LB Dressing Assistance 3  Moderate Assistance   LB Dressing Deficit Setup;Steadying;Verbal cueing;Supervision/safety; Increased time to complete; Don/doff R sock; Don/doff L sock; Thread LLE into underwear   LB Dressing Comments pt able to thread L LE into underwear w/ assist to thread R LE, educated on dressing the weaker LE first; pt able to pull up to knees w/ min steadying and min assist to pull up over hips   Functional Standing Tolerance   Time 2 minutes    Activity w/ increased time and min assist steadying for ADL tasks   Comments w/ RW support   Bed Mobility   Additional Comments pt seated in recliner pre/post session   Transfers   Sit to Stand 4  Minimal assistance   Additional items Assist x 1;Assist x 2; Increased time required;Verbal cues;Armrests  (assist x1-2)   Stand to Sit 4  Minimal assistance   Additional items Assist x 1;Assist x 2; Increased time required;Verbal cues;Armrests   Additional Comments VCs for safety and positioning   Functional Mobility   Functional Mobility 4  Minimal assistance   Additional Comments assist x2 w/ increased time and decreased endurance   Additional items Rolling walker   Cognition   Overall Cognitive Status Impaired   Arousal/Participation Cooperative;Responsive   Attention Attends with cues to redirect   Orientation Level Oriented to person;Disoriented to time;Disoriented to place; Disoriented to situation   Memory Decreased short term memory;Decreased recall of recent events;Decreased recall of precautions   Following Commands Follows one step commands with increased time or repetition   Comments pt w/ impaired insight and safety awareness, increased processing time; decreased orientation   Additional Activities   Additional Activities (education on pacing self and participating in therapy)   Additional Activities Comments pt receptive   Activity Tolerance   Activity Tolerance Patient tolerated treatment well;Patient limited by fatigue   Medical Staff Made Aware appropriate to see per RN   Assessment   Assessment Pt seen for skilled OT session focused on ADLs, functional transfers  Pt required min assist for grooming tasks of washing face, denture care while seated w/ increased time  Pt w/ MIN assist assist for UB Bathing  Pt w/ MIN assist and [facility]  Pt w/ MOD assist LB Bathing   Pt w/ MOD assist for LB Dressing, pt w/ assist to don/doff socks, pt able to thread L LE through underpants and required assist to thread R LE through underpants and able to pull up to knees while seated  Pt seated in recliner at start of session  Pt w/ MIN assist x1-2 sit>stand from recliner w/ VCs for hand positioning  Pt w/ MIN assist steadying w/ RW and min assist to pull up underpants over hips while in stance for 2 minutes  Pt w/ MIN assist x1 stand>sit w/ cues for hand placement  Pt w/ setup for lunch  Pt seated in recliner w/ LEs elevated and chair alarm intact at end of session  Pt continues to be limited due to decreased strength and endurance, impaired functional reach, fair activity tolerance, impaired cognition (requires reorientation), all causing a decline in ADLs, functional transfers and mobility  Recommend STR when medically stable  Will continue to follow to address OT POC  Plan   Treatment Interventions ADL retraining;Functional transfer training;UE strengthening/ROM; Endurance training;Cognitive reorientation;Patient/family training; Compensatory technique education; Activityengagement   Goal Expiration Date 12/11/18   Treatment Day 1   OT Frequency 3-5x/wk   Recommendation   OT Discharge Recommendation Short Term Rehab   Barthel Index   Feeding 10   Bathing 0   Grooming Score 0   Dressing Score 5   Bladder Score 5   Bowels Score 5   Toilet Use Score 5   Transfers (Bed/Chair) Score 10   Mobility (Level Surface) Score 0   Stairs Score 0   Barthel Index Score 40   Modified Iron Scale   Modified Iron Scale 4     Documentation completed by: Pat Saha, MS, OTR/L

## 2018-12-01 ENCOUNTER — APPOINTMENT (INPATIENT)
Dept: ULTRASOUND IMAGING | Facility: HOSPITAL | Age: 78
DRG: 644 | End: 2018-12-01
Payer: MEDICARE

## 2018-12-01 PROBLEM — R07.9 CHEST PAIN: Status: RESOLVED | Noted: 2018-11-26 | Resolved: 2018-12-01

## 2018-12-01 PROBLEM — O22.30 DVT (DEEP VEIN THROMBOSIS) IN PREGNANCY: Status: ACTIVE | Noted: 2018-12-01

## 2018-12-01 LAB
GLUCOSE SERPL-MCNC: 114 MG/DL (ref 65–140)
GLUCOSE SERPL-MCNC: 248 MG/DL (ref 65–140)
GLUCOSE SERPL-MCNC: 252 MG/DL (ref 65–140)
GLUCOSE SERPL-MCNC: 415 MG/DL (ref 65–140)

## 2018-12-01 PROCEDURE — 82948 REAGENT STRIP/BLOOD GLUCOSE: CPT

## 2018-12-01 PROCEDURE — 93970 EXTREMITY STUDY: CPT | Performed by: SURGERY

## 2018-12-01 PROCEDURE — 99232 SBSQ HOSP IP/OBS MODERATE 35: CPT | Performed by: GENERAL PRACTICE

## 2018-12-01 PROCEDURE — 93970 EXTREMITY STUDY: CPT

## 2018-12-01 RX ADMIN — DOCUSATE SODIUM 100 MG: 100 CAPSULE, LIQUID FILLED ORAL at 09:00

## 2018-12-01 RX ADMIN — HEPARIN SODIUM 5000 UNITS: 5000 INJECTION, SOLUTION INTRAVENOUS; SUBCUTANEOUS at 06:06

## 2018-12-01 RX ADMIN — METOPROLOL TARTRATE 25 MG: 25 TABLET, FILM COATED ORAL at 09:00

## 2018-12-01 RX ADMIN — LIDOCAINE 2 PATCH: 50 PATCH CUTANEOUS at 09:00

## 2018-12-01 RX ADMIN — INSULIN LISPRO 6 UNITS: 100 INJECTION, SOLUTION INTRAVENOUS; SUBCUTANEOUS at 17:57

## 2018-12-01 RX ADMIN — APIXABAN 10 MG: 5 TABLET, FILM COATED ORAL at 14:21

## 2018-12-01 RX ADMIN — APIXABAN 10 MG: 5 TABLET, FILM COATED ORAL at 22:01

## 2018-12-01 RX ADMIN — LISINOPRIL 20 MG: 20 TABLET ORAL at 09:01

## 2018-12-01 RX ADMIN — SENNOSIDES 8.6 MG: 8.6 TABLET, FILM COATED ORAL at 09:01

## 2018-12-01 RX ADMIN — Medication 400 MG: at 09:00

## 2018-12-01 RX ADMIN — INSULIN LISPRO 3 UNITS: 100 INJECTION, SOLUTION INTRAVENOUS; SUBCUTANEOUS at 22:02

## 2018-12-01 RX ADMIN — ATORVASTATIN CALCIUM 20 MG: 20 TABLET, FILM COATED ORAL at 17:56

## 2018-12-01 RX ADMIN — INSULIN GLARGINE 20 UNITS: 100 INJECTION, SOLUTION SUBCUTANEOUS at 22:01

## 2018-12-01 RX ADMIN — Medication 400 MG: at 17:56

## 2018-12-01 RX ADMIN — INSULIN LISPRO 3 UNITS: 100 INJECTION, SOLUTION INTRAVENOUS; SUBCUTANEOUS at 12:35

## 2018-12-01 RX ADMIN — METOPROLOL TARTRATE 25 MG: 25 TABLET, FILM COATED ORAL at 22:00

## 2018-12-01 RX ADMIN — DOCUSATE SODIUM 100 MG: 100 CAPSULE, LIQUID FILLED ORAL at 17:56

## 2018-12-01 NOTE — PROGRESS NOTES
Progress Note - Joi Orlando 1940, 66 y o  female MRN: [id number]    Unit/Bed#: -01 Encounter: [id number]    Primary Care Provider: No primary care provider on file  Date and time admitted to hospital: 11/26/2018  5:54 AM        DVT (deep vein thrombosis) in pregnancy [facility])   Assessment & Plan    Subacute-chronic b/l legs  C/o leg pain last evening  Start eliquis     Abnormal TSH   Assessment & Plan    Obtain free T3, T4 which are normal  Most likely represent sick euthyroid syndrome  Hypercalcemia   Assessment & Plan    pth elevated-enodcrine consult phos normal  Vit d level low-will need outpatient follow up of hyperparathyroidism  Endocrine consult appreciated     Essential hypertension   Assessment & Plan    Elevated on presentation  Patient did receive nitropaste, blood pressure down to 160 x 80  Continue lisinopril, low-dose of metoprolol  Type 2 diabetes mellitus [facility])   Assessment & Plan    Lab Results   Component Value Date    HGBA1C 10 2 (H) 11/26/2018       Recent Labs      11/30/18   1100  11/30/18   1604  11/30/18   2109  12/01/18   0634   POCGLU  249*  327*  274*  114       Blood Sugar Average: Last 72 hrs:  (P) 214 [id number] hemoglobin A1c  Continue Lantus, pre meal NovoLog and sliding scale insulin  Blood sugars uncontrolled on presentation  Endocrine consult appreciated       Weakness   Assessment & Plan    PT/OT consults-STR     * Chest painresolved as of 12/1/2018   Assessment & Plan    Troponin negative-reproducible to palption left shoulder/trapezius-ETT negative  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  lidoderm patch             VTE Pharmacologic Prophylaxis:   Pharmacologic: Heparin  Mechanical VTE Prophylaxis in Place: Yes    Patient Centered Rounds: I have performed bedside rounds with nursing staff today      Discussions with Specialists or Other Care Team Provider:     Education and Discussions with Family / Patient: Time Spent for Care: 30 minutes  More than 50% of total time spent on counseling and coordination of care as described above  Current Length of Stay: 4 day(s)    Current Patient Status: Inpatient   Certification Statement: The patient will continue to require additional inpatient hospital stay due to awaiting STR    Discharge Plan: STR    Code Status: Level 1 - Full Code      Subjective:   Less left shoulder pain    Objective:     Vitals:   Temp (24hrs), Av 7 °F (37 1 °C), Min:98 1 °F (36 7 °C), Max:99 2 °F (37 3 °C)    Temp:  [98 1 °F (36 7 °C)-99 2 °F (37 3 °C)] 98 1 °F (36 7 °C)  HR:  [60-65] 60  Resp:  [16-18] 18  BP: (130-146)/(55-63) 130/60  SpO2:  [96 %-98 %] 96 %  Body mass index is 31 24 kg/m²  Input and Output Summary (last 24 hours): Intake/Output Summary (Last 24 hours) at 18 1340  Last data filed at 18 1331   Gross per 24 hour   Intake             1320 ml   Output             1050 ml   Net              270 ml       Physical Exam:     Physical Exam   Constitutional: She is oriented to person, place, and time  She appears well-developed and well-nourished  HENT:   Head: Normocephalic and atraumatic  Eyes: Pupils are equal, round, and reactive to light  Cardiovascular: Normal rate and regular rhythm  Pulmonary/Chest: Effort normal and breath sounds normal    Abdominal: Soft  Bowel sounds are normal    Musculoskeletal: She exhibits no edema  Neurological: She is alert and oriented to person, place, and time  Additional Data:     Labs:      Results from last 7 days  Lab Units 18  0455  18  0622   WBC Thousand/uL 6 29  --  6 24   HEMOGLOBIN g/dL 13 1  --  13 6   HEMATOCRIT % 38 1  --  39 5   PLATELETS Thousands/uL 177  < > 182   NEUTROS PCT %  --   --  37*   LYMPHS PCT %  --   --  54*   MONOS PCT %  --   --  6   EOS PCT %  --   --  2   < > = values in this interval not displayed      Results from last 7 days  Lab Units 18  5764 11/26/18  0622   SODIUM mmol/L 140  < > 137   POTASSIUM mmol/L 3 9  < > 3 8   CHLORIDE mmol/L 103  < > 98*   CO2 mmol/L 30  < > 32   BUN mg/dL 17  < > 18   CREATININE mg/dL 0 84  < > 1 02   ANION GAP mmol/L 7  < > 7   CALCIUM mg/dL 10 0  < > 10 4*   ALBUMIN g/dL  --   --  3 4*   TOTAL BILIRUBIN mg/dL  --   --  1 00   ALK PHOS U/L  --   --  89   ALT U/L  --   --  23   AST U/L  --   --  14   GLUCOSE RANDOM mg/dL 98  < > 314*   < > = values in this interval not displayed  Results from last 7 days  Lab Units 11/27/18  0455   INR  0 97       Results from last 7 days  Lab Units 12/01/18  1131 12/01/18  0634 11/30/18  2109 11/30/18  1604 11/30/18  1100 11/30/18  0603 11/29/18  2059 11/29/18  1551 11/29/18  1127 11/29/18  0701 11/28/18  2116 11/28/18  1624   POC GLUCOSE mg/dl 252* 114 274* 327* 249* 146* 99 186* 304* 264* 220* 233*       Results from last 7 days  Lab Units 11/26/18  1000   HEMOGLOBIN A1C % 10 2*               * I Have Reviewed All Lab Data Listed Above  * Additional Pertinent Lab Tests Reviewed:  All Labs Within Last 24 Hours Reviewed    Imaging:    Imaging Reports Reviewed Today Include:   Imaging Personally Reviewed by Myself Includes:      Recent Cultures (last 7 days):           Last 24 Hours Medication List:     Current Facility-Administered Medications:  acetaminophen 650 mg Oral Q8H PRN Femi Freedman MD   apixaban 10 mg Oral BID Nuris Kathrine MD Jennifer   atorvastatin 20 mg Oral Daily With Sky Jeong MD   docusate sodium 100 mg Oral BID Femi Freedman MD   insulin glargine 20 Units Subcutaneous HS Femi Freedman MD   insulin lispro 1-6 Units Subcutaneous TID AC Ynes Pearce MD   insulin lispro 1-6 Units Subcutaneous HS Ynes Pina MD   insulin lispro 14 Units Subcutaneous TID With Meals Prieto Goodwin MD   lidocaine 2 patch Topical Daily Mecca A MD Jennifer   lisinopril 20 mg Oral Daily Clif Nugent PA-C   magnesium oxide 400 mg Oral BID Ynes YANG Elma Hernandez MD   metoprolol tartrate 25 mg Oral Q12H [address] Ynes Vickers MD   morphine injection 2 mg Intravenous Q4H PRN Ynes Vickers MD   nitroglycerin 0 4 mg Sublingual Q5 Min PRN Ynes Vickers MD   ondansetron 4 mg Intravenous Q6H PRN Carlitoalejose alfredo Braun, MD   senna 1 tablet Oral Daily Carlitoalene Route, MD        Today, Patient Was Seen By: Caro Singh MD    ** Please Note: Dictation voice to text software may have been used in the creation of this document   **

## 2018-12-01 NOTE — ASSESSMENT & PLAN NOTE
Lab Results   Component Value Date    HGBA1C 10 2 (H) 11/26/2018       Recent Labs      11/30/18   1100  11/30/18   1604  11/30/18   2109  12/01/18   0634   POCGLU  249*  327*  274*  114       Blood Sugar Average: Last 72 hrs:  (P) 214 [id number] hemoglobin A1c  Continue Lantus, pre meal NovoLog and sliding scale insulin  Blood sugars uncontrolled on presentation      Endocrine consult appreciated

## 2018-12-02 ENCOUNTER — TELEPHONE (OUTPATIENT)
Dept: OTHER | Facility: OTHER | Age: 78
End: 2018-12-02

## 2018-12-02 ENCOUNTER — APPOINTMENT (INPATIENT)
Dept: CT IMAGING | Facility: HOSPITAL | Age: 78
DRG: 644 | End: 2018-12-02
Payer: MEDICARE

## 2018-12-02 VITALS
WEIGHT: 205.25 LBS | DIASTOLIC BLOOD PRESSURE: 51 MMHG | BODY MASS INDEX: 31.11 KG/M2 | SYSTOLIC BLOOD PRESSURE: 117 MMHG | RESPIRATION RATE: 20 BRPM | HEART RATE: 60 BPM | HEIGHT: 68 IN | OXYGEN SATURATION: 95 % | TEMPERATURE: 98.7 F

## 2018-12-02 PROBLEM — N83.209 OVARIAN CYST: Status: ACTIVE | Noted: 2018-12-02

## 2018-12-02 PROBLEM — E04.2 MULTIPLE THYROID NODULES: Status: ACTIVE | Noted: 2018-12-02

## 2018-12-02 PROBLEM — I82.509 CHRONIC DEEP VEIN THROMBOSIS (DVT) (HCC): Status: ACTIVE | Noted: 2018-12-01

## 2018-12-02 LAB
GLUCOSE SERPL-MCNC: 161 MG/DL (ref 65–140)
GLUCOSE SERPL-MCNC: 162 MG/DL (ref 65–140)
GLUCOSE SERPL-MCNC: 266 MG/DL (ref 65–140)

## 2018-12-02 PROCEDURE — 74176 CT ABD & PELVIS W/O CONTRAST: CPT

## 2018-12-02 PROCEDURE — 99239 HOSP IP/OBS DSCHRG MGMT >30: CPT | Performed by: GENERAL PRACTICE

## 2018-12-02 PROCEDURE — 82948 REAGENT STRIP/BLOOD GLUCOSE: CPT

## 2018-12-02 PROCEDURE — 71250 CT THORAX DX C-: CPT

## 2018-12-02 RX ORDER — ATORVASTATIN CALCIUM 20 MG/1
20 TABLET, FILM COATED ORAL
Qty: 30 TABLET | Refills: 0 | Status: SHIPPED | OUTPATIENT
Start: 2018-12-02

## 2018-12-02 RX ORDER — LIDOCAINE 50 MG/G
2 PATCH TOPICAL DAILY
Qty: 30 PATCH | Refills: 0 | Status: SHIPPED | OUTPATIENT
Start: 2018-12-03

## 2018-12-02 RX ORDER — SENNOSIDES 8.6 MG
1 TABLET ORAL DAILY
Qty: 120 EACH | Refills: 0 | Status: SHIPPED | OUTPATIENT
Start: 2018-12-03

## 2018-12-02 RX ADMIN — SENNOSIDES 8.6 MG: 8.6 TABLET, FILM COATED ORAL at 08:23

## 2018-12-02 RX ADMIN — LISINOPRIL 20 MG: 20 TABLET ORAL at 08:23

## 2018-12-02 RX ADMIN — LIDOCAINE 2 PATCH: 50 PATCH CUTANEOUS at 08:30

## 2018-12-02 RX ADMIN — ATORVASTATIN CALCIUM 20 MG: 20 TABLET, FILM COATED ORAL at 18:14

## 2018-12-02 RX ADMIN — DOCUSATE SODIUM 100 MG: 100 CAPSULE, LIQUID FILLED ORAL at 08:23

## 2018-12-02 RX ADMIN — APIXABAN 10 MG: 5 TABLET, FILM COATED ORAL at 08:23

## 2018-12-02 RX ADMIN — INSULIN LISPRO 1 UNITS: 100 INJECTION, SOLUTION INTRAVENOUS; SUBCUTANEOUS at 11:40

## 2018-12-02 RX ADMIN — INSULIN LISPRO 3 UNITS: 100 INJECTION, SOLUTION INTRAVENOUS; SUBCUTANEOUS at 18:14

## 2018-12-02 RX ADMIN — METOPROLOL TARTRATE 25 MG: 25 TABLET, FILM COATED ORAL at 08:23

## 2018-12-02 RX ADMIN — DOCUSATE SODIUM 100 MG: 100 CAPSULE, LIQUID FILLED ORAL at 18:13

## 2018-12-02 RX ADMIN — Medication 400 MG: at 08:23

## 2018-12-02 RX ADMIN — INSULIN LISPRO 1 UNITS: 100 INJECTION, SOLUTION INTRAVENOUS; SUBCUTANEOUS at 08:14

## 2018-12-02 RX ADMIN — APIXABAN 10 MG: 5 TABLET, FILM COATED ORAL at 18:13

## 2018-12-02 NOTE — DISCHARGE SUMMARY
Discharge- Pao Bedolla 1940, 66 y o  female MRN: [id number]    Unit/Bed#: -01 Encounter: [id number]    Primary Care Provider: No primary care provider on file  Date and time admitted to hospital: 11/26/2018  5:54 AM        Ovarian cyst   Assessment & Plan    Will need outpatient ultrasound     Chronic deep vein thrombosis (DVT) (HCC)   Assessment & Plan    Subacute-chronic b/l legs; per patient she has been on eliquis in the past but doesn't remember having dvt  C/o leg pain last evening  Started eliquis, hemodynamically stable  Echo pulmonary artery pressure noted and case d/w cardiology-will continue eliquis  Venous doppler reviewed with Dr John Hobson vascular surgery-anticoagulation appropriate     Hypercalcemia   Assessment & Plan    pth elevated-enodcrine consult phos normal  Vit d level low-will need outpatient follow up of hyperparathyroidism  Endocrine consult appreciated     Essential hypertension   Assessment & Plan    Elevated on presentation  Patient did receive nitropaste, blood pressure down to 160 x 80  Continue lisinopril, low-dose of metoprolol  Type 2 diabetes mellitus [facility])   Assessment & Plan    Lab Results   Component Value Date    HGBA1C 10 2 (H) 11/26/2018       Recent Labs      12/01/18   2108  12/02/18   0633  12/02/18   1124  12/02/18   1649   POCGLU  248*  161*  162*  266*       Blood Sugar Average: Last 72 hrs:  (P) 231 [id number] hemoglobin A1c  Continue Lantus, pre meal NovoLog and sliding scale insulin  Blood sugars uncontrolled on presentation      Endocrine consult appreciated       Weakness   Assessment & Plan    PT/OT consults-STR transfer today     * Chest pain   Assessment & Plan    Troponin negative-reproducible to palption left shoulder/trapezius-ETT negative  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  lidoderm patch  With subacute-chronic dvt and on eliquis-echo reviewed with cardiology will continue eliquis Discharging Physician / Practitioner: Duane Harris MD  PCP: No primary care provider on file  Admission Date:   Admission Orders     Ordered        11/27/18 2341  Inpatient Admission  Once         11/26/18 3512  Place in Observation (expected length of stay for this patient is less than two midnights)  Once             Discharge Date: 12/02/18    Resolved Problems  Date Reviewed: 12/2/2018    None          Consultations During Hospital Stay:  · Endocrinology and cardiology    Procedures Performed:     ·     Significant Findings / Test Results:     dvt lower extremities- acute-subacute to chronic  Poorly controlled type 2 diabetes    Incidental Findings:   · Hyperparathyroidism  · Pelvic cyst  · Thyroid nodules    Test Results Pending at Discharge (will require follow up):   ·      Outpatient Tests Requested:  · Follow up endocrinology  · 24 urine calcium      Ct abd pelvis results recommend outpatient---    Incidental thyroid nodule(s) for which nonemergent thyroid ultrasound is recommended     4 9 cm simple appearing pelvic cyst, which may arise from the left ovary  Recommend pelvic ultrasound for further evaluation              Complications:      Reason for Admission:     Hospital Course:     Jesus Tobar is a 66 y o  female patient who originally presented to the hospital on 11/26/2018 due to weakness and chest pain    Please see above list of diagnoses and related plan for additional information  Condition at Discharge: stable     Discharge Day Visit / Exam:     Subjective:  No c/o-  Vitals: Blood Pressure: 117/51 (12/02/18 1500)  Pulse: 60 (12/02/18 1500)  Temperature: 98 7 °F (37 1 °C) (12/02/18 1500)  Temp Source: Oral (12/02/18 1500)  Respirations: 20 (12/02/18 1500)  Height: 5' 8" (172 7 cm) (11/27/18 1514)  Weight - Scale: 93 1 kg (205 lb 4 oz) (12/02/18 0600)  SpO2: 95 % (12/02/18 1500)  Exam:   Physical Exam   Constitutional: She is oriented to person, place, and time     HENT: Head: Normocephalic and atraumatic  Eyes: Pupils are equal, round, and reactive to light  Cardiovascular: Normal rate and regular rhythm  Pulmonary/Chest: Effort normal and breath sounds normal    Abdominal: Soft  Musculoskeletal: She exhibits no edema  Neurological: She is alert and oriented to person, place, and time  Discussion with Family:     Discharge instructions/Information to patient and family:   See after visit summary for information provided to patient and family  Provisions for Follow-Up Care:  See after visit summary for information related to follow-up care and any pertinent home health orders  Disposition:     Other [place] [facility]    For Discharges to [facility]:   · Not Applicable to this Patient - Not Applicable to this Patient    Planned Readmission:      Discharge Statement:  I spent 40 minutes discharging the patient  This time was spent on the day of discharge  I had direct contact with the patient on the day of discharge  Greater than 50% of the total time was spent examining patient, answering all patient questions, arranging and discussing plan of care with patient as well as directly providing post-discharge instructions  Additional time then spent on discharge activities  Discharge Medications:  See after visit summary for reconciled discharge medications provided to patient and family        ** Please Note: This note has been constructed using a voice recognition system **

## 2018-12-02 NOTE — ASSESSMENT & PLAN NOTE
Subacute-chronic b/l legs; per patient she has been on eliquis in the past but doesn't remember having dvt  C/o leg pain last evening  Started eliquis, hemodynamically stable  Echo pulmonary artery pressure noted and case d/w cardiology-will continue eliquis  Venous doppler reviewed with Dr Scooby Morrow vascular surgery-anticoagulation appropriate

## 2018-12-02 NOTE — PROGRESS NOTES
Called son Zaid Licea [phone] as per request- left message with name of facility, address, and phone number of Elisabet Perry RN

## 2018-12-02 NOTE — ASSESSMENT & PLAN NOTE
Subacute-chronic b/l legs; per patient she has been on eliquis in the past but doesn't remember having dvt  C/o leg pain last evening  Pm eliquis

## 2018-12-02 NOTE — SOCIAL WORK
Cm met with patient son Olga Farah at patient bedside, Olga Farah is aware patient will discharge to [facility] transitional unit this day and desire patient to remain transported at 7pm via [facility] (earliest available)  Family aware of patient medicare rights, no reported needs at this time  RN aware to call report to facility  Cm contacted  [facility] who confirmed [facility] are receiving patient this day

## 2018-12-02 NOTE — ASSESSMENT & PLAN NOTE
Lab Results   Component Value Date    HGBA1C 10 2 (H) 11/26/2018       Recent Labs      12/01/18   1131  12/01/18   1637  12/01/18   2108  12/02/18   0633   POCGLU  252*  415*  248*  161*       Blood Sugar Average: Last 72 hrs:  (P) 233 [id number] hemoglobin A1c  Continue Lantus, pre meal NovoLog and sliding scale insulin  Blood sugars uncontrolled on presentation      Endocrine consult appreciated

## 2018-12-02 NOTE — DISCHARGE INSTR - AVS FIRST PAGE
Follow up with your pcp in [place] after discharge within 1 week  Need follow up of blood clots in legs to tell you how long to stay on eliquis, thyroid ultrasound and pelvic ultrasound to follow up possible ovarian cyst    Follow up with endocrinology for diabetes and parathyroid and thyroid

## 2018-12-03 NOTE — ASSESSMENT & PLAN NOTE
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

## 2018-12-03 NOTE — NURSING NOTE
Patient alert and oriented, in no acute distress  Left facility per stretcher transported by transport team to [facility] transitional unit  Son accompanied patient  In stable condition  All belongings taken by patient

## 2018-12-03 NOTE — ASSESSMENT & PLAN NOTE
Lab Results   Component Value Date    HGBA1C 10 2 (H) 11/26/2018       Recent Labs      12/01/18   2108  12/02/18   0633  12/02/18   1124  12/02/18   1649   POCGLU  248*  161*  162*  266*       Blood Sugar Average: Last 72 hrs:  (P) 231 [id number] hemoglobin A1c  Continue Lantus, pre meal NovoLog and sliding scale insulin  Blood sugars uncontrolled on presentation      Endocrine consult appreciated

## 2018-12-04 NOTE — UTILIZATION REVIEW
Continued Stay Review    Date: 12/1/18   INPATIENT   completed on 12/3/18    Vital Signs:   12/01/18 0744  98 1 °F (36 7 °C)  60  18  130/60  --  96 %  None          Abnormal Labs/Diagnostic Results:  11/28: L shoulder: severe osteoarthritis L glenohumeral joint  T spine: nothing acute     11/30: UA: gluc>=1000, elev leuks   +1 prot   2 urobilin  occ bacteria/epithelials  Gluc 274  12/1: gluc 252, 415, 248, 161, 162, 266      Age/Sex: 66 y o  female     Assessment/Plan:   11/28 endocrine cons: very poorly controlled dm-increase mealtime insulin and adjust as needed, check for urine microalbumin; hypercalcemia likely due to hyperparathyroidism  Check d25 and 24 hr urine calcium  Consider sestamibi scan as outpt  12/1: has subacute-chronic BLE dvt's, start eliquis  C/o leg pain last evening  Abnormal TSH discovered, most likely sick euthyroid syndrome  Endocrine consulted due to hypercalcemia-vid d is low, needs outpt f/u for hyperparathyroidism  Nitropaste given for htn-bp now 150/80  Continue ace and bb       Discharge Plan: await STR

## 2022-02-08 NOTE — ED NOTES
Report given to med surg 4 RN        Sudha Aguilar RN  05/15/18 9755 Spoke with patient and explained results and recommendations below. Patient verbalized understanding and denied further questions.   She would like to have colonoscopy closer to home in [place]. She will have the results faxed to the office.   Surgical history updated.

## 2024-05-08 NOTE — PLAN OF CARE
CARDIOVASCULAR - ADULT     Absence of cardiac dysrhythmias or at baseline rhythm Progressing        MUSCULOSKELETAL - ADULT     Maintain or return mobility to safest level of function Progressing        Nutrition/Hydration-ADULT     Nutrient/Hydration intake appropriate for improving, restoring or maintaining nutritional needs Progressing        PAIN - ADULT     Verbalizes/displays adequate comfort level or baseline comfort level Progressing        Potential for Falls     Patient will remain free of falls Progressing        SAFETY ADULT     Maintain or return to baseline ADL function Progressing     Maintain or return mobility status to optimal level Progressing     Patient will remain free of falls Progressing This patient has upcoming appointment, no urgent lab results, will review with patient at that time.